# Patient Record
Sex: FEMALE | Race: WHITE | NOT HISPANIC OR LATINO | URBAN - METROPOLITAN AREA
[De-identification: names, ages, dates, MRNs, and addresses within clinical notes are randomized per-mention and may not be internally consistent; named-entity substitution may affect disease eponyms.]

---

## 2020-03-26 ENCOUNTER — INPATIENT (INPATIENT)
Facility: HOSPITAL | Age: 80
LOS: 7 days | Discharge: HOME | DRG: 56 | End: 2020-04-03
Attending: FAMILY MEDICINE | Admitting: FAMILY MEDICINE
Payer: COMMERCIAL

## 2020-03-26 VITALS
DIASTOLIC BLOOD PRESSURE: 88 MMHG | OXYGEN SATURATION: 97 % | RESPIRATION RATE: 15 BRPM | SYSTOLIC BLOOD PRESSURE: 156 MMHG | TEMPERATURE: 98 F | HEART RATE: 92 BPM

## 2020-03-26 DIAGNOSIS — C50.919 MALIGNANT NEOPLASM OF UNSPECIFIED SITE OF UNSPECIFIED FEMALE BREAST: ICD-10-CM

## 2020-03-26 DIAGNOSIS — G30.9 ALZHEIMER'S DISEASE, UNSPECIFIED: ICD-10-CM

## 2020-03-26 DIAGNOSIS — Z29.9 ENCOUNTER FOR PROPHYLACTIC MEASURES, UNSPECIFIED: ICD-10-CM

## 2020-03-26 DIAGNOSIS — R29.6 REPEATED FALLS: ICD-10-CM

## 2020-03-26 LAB
ALBUMIN SERPL ELPH-MCNC: 3.4 G/DL — SIGNIFICANT CHANGE UP (ref 3.3–5)
ALP SERPL-CCNC: 90 U/L — SIGNIFICANT CHANGE UP (ref 40–120)
ALT FLD-CCNC: 71 U/L — SIGNIFICANT CHANGE UP (ref 12–78)
ANION GAP SERPL CALC-SCNC: 7 MMOL/L — SIGNIFICANT CHANGE UP (ref 5–17)
APTT BLD: 26.3 SEC — LOW (ref 28.5–37)
AST SERPL-CCNC: 261 U/L — HIGH (ref 15–37)
BASOPHILS # BLD AUTO: 0.02 K/UL — SIGNIFICANT CHANGE UP (ref 0–0.2)
BASOPHILS NFR BLD AUTO: 0.2 % — SIGNIFICANT CHANGE UP (ref 0–2)
BILIRUB SERPL-MCNC: 0.4 MG/DL — SIGNIFICANT CHANGE UP (ref 0.2–1.2)
BUN SERPL-MCNC: 20 MG/DL — SIGNIFICANT CHANGE UP (ref 7–23)
CALCIUM SERPL-MCNC: 9 MG/DL — SIGNIFICANT CHANGE UP (ref 8.5–10.1)
CHLORIDE SERPL-SCNC: 109 MMOL/L — HIGH (ref 96–108)
CO2 SERPL-SCNC: 27 MMOL/L — SIGNIFICANT CHANGE UP (ref 22–31)
CREAT SERPL-MCNC: 1.2 MG/DL — SIGNIFICANT CHANGE UP (ref 0.5–1.3)
EOSINOPHIL # BLD AUTO: 0 K/UL — SIGNIFICANT CHANGE UP (ref 0–0.5)
EOSINOPHIL NFR BLD AUTO: 0 % — SIGNIFICANT CHANGE UP (ref 0–6)
GLUCOSE SERPL-MCNC: 131 MG/DL — HIGH (ref 70–99)
HCT VFR BLD CALC: 41.9 % — SIGNIFICANT CHANGE UP (ref 34.5–45)
HGB BLD-MCNC: 13.4 G/DL — SIGNIFICANT CHANGE UP (ref 11.5–15.5)
IMM GRANULOCYTES NFR BLD AUTO: 0.5 % — SIGNIFICANT CHANGE UP (ref 0–1.5)
INR BLD: 1.05 RATIO — SIGNIFICANT CHANGE UP (ref 0.88–1.16)
LYMPHOCYTES # BLD AUTO: 0.89 K/UL — LOW (ref 1–3.3)
LYMPHOCYTES # BLD AUTO: 7.8 % — LOW (ref 13–44)
MCHC RBC-ENTMCNC: 28.5 PG — SIGNIFICANT CHANGE UP (ref 27–34)
MCHC RBC-ENTMCNC: 32 GM/DL — SIGNIFICANT CHANGE UP (ref 32–36)
MCV RBC AUTO: 89.1 FL — SIGNIFICANT CHANGE UP (ref 80–100)
MONOCYTES # BLD AUTO: 1.34 K/UL — HIGH (ref 0–0.9)
MONOCYTES NFR BLD AUTO: 11.7 % — SIGNIFICANT CHANGE UP (ref 2–14)
NEUTROPHILS # BLD AUTO: 9.13 K/UL — HIGH (ref 1.8–7.4)
NEUTROPHILS NFR BLD AUTO: 79.8 % — HIGH (ref 43–77)
NRBC # BLD: 0 /100 WBCS — SIGNIFICANT CHANGE UP (ref 0–0)
PLATELET # BLD AUTO: 165 K/UL — SIGNIFICANT CHANGE UP (ref 150–400)
POTASSIUM SERPL-MCNC: 3.8 MMOL/L — SIGNIFICANT CHANGE UP (ref 3.5–5.3)
POTASSIUM SERPL-SCNC: 3.8 MMOL/L — SIGNIFICANT CHANGE UP (ref 3.5–5.3)
PROT SERPL-MCNC: 7.2 G/DL — SIGNIFICANT CHANGE UP (ref 6–8.3)
PROTHROM AB SERPL-ACNC: 11.8 SEC — SIGNIFICANT CHANGE UP (ref 10–12.9)
RBC # BLD: 4.7 M/UL — SIGNIFICANT CHANGE UP (ref 3.8–5.2)
RBC # FLD: 15.4 % — HIGH (ref 10.3–14.5)
SODIUM SERPL-SCNC: 143 MMOL/L — SIGNIFICANT CHANGE UP (ref 135–145)
WBC # BLD: 11.44 K/UL — HIGH (ref 3.8–10.5)
WBC # FLD AUTO: 11.44 K/UL — HIGH (ref 3.8–10.5)

## 2020-03-26 PROCEDURE — 73630 X-RAY EXAM OF FOOT: CPT | Mod: 26,RT

## 2020-03-26 PROCEDURE — 73502 X-RAY EXAM HIP UNI 2-3 VIEWS: CPT | Mod: 26,RT

## 2020-03-26 PROCEDURE — 73080 X-RAY EXAM OF ELBOW: CPT | Mod: 26,RT

## 2020-03-26 PROCEDURE — 72125 CT NECK SPINE W/O DYE: CPT | Mod: 26

## 2020-03-26 PROCEDURE — 93010 ELECTROCARDIOGRAM REPORT: CPT

## 2020-03-26 PROCEDURE — 74176 CT ABD & PELVIS W/O CONTRAST: CPT | Mod: 26

## 2020-03-26 PROCEDURE — 99222 1ST HOSP IP/OBS MODERATE 55: CPT | Mod: GC

## 2020-03-26 PROCEDURE — 70450 CT HEAD/BRAIN W/O DYE: CPT | Mod: 26

## 2020-03-26 PROCEDURE — 93971 EXTREMITY STUDY: CPT | Mod: 26,RT

## 2020-03-26 PROCEDURE — 99285 EMERGENCY DEPT VISIT HI MDM: CPT

## 2020-03-26 PROCEDURE — 73552 X-RAY EXAM OF FEMUR 2/>: CPT | Mod: 26,RT

## 2020-03-26 PROCEDURE — 71250 CT THORAX DX C-: CPT | Mod: 26

## 2020-03-26 RX ORDER — SODIUM CHLORIDE 9 MG/ML
1000 INJECTION INTRAMUSCULAR; INTRAVENOUS; SUBCUTANEOUS ONCE
Refills: 0 | Status: COMPLETED | OUTPATIENT
Start: 2020-03-26 | End: 2020-03-26

## 2020-03-26 RX ORDER — HEPARIN SODIUM 5000 [USP'U]/ML
5000 INJECTION INTRAVENOUS; SUBCUTANEOUS EVERY 8 HOURS
Refills: 0 | Status: DISCONTINUED | OUTPATIENT
Start: 2020-03-26 | End: 2020-04-03

## 2020-03-26 RX ORDER — ANASTROZOLE 1 MG/1
1 TABLET ORAL DAILY
Refills: 0 | Status: DISCONTINUED | OUTPATIENT
Start: 2020-03-26 | End: 2020-04-03

## 2020-03-26 RX ORDER — AZITHROMYCIN 500 MG/1
500 TABLET, FILM COATED ORAL ONCE
Refills: 0 | Status: COMPLETED | OUTPATIENT
Start: 2020-03-26 | End: 2020-03-26

## 2020-03-26 RX ORDER — ACETAMINOPHEN 500 MG
650 TABLET ORAL EVERY 6 HOURS
Refills: 0 | Status: DISCONTINUED | OUTPATIENT
Start: 2020-03-26 | End: 2020-04-03

## 2020-03-26 RX ADMIN — SODIUM CHLORIDE 1000 MILLILITER(S): 9 INJECTION INTRAMUSCULAR; INTRAVENOUS; SUBCUTANEOUS at 17:57

## 2020-03-26 RX ADMIN — AZITHROMYCIN 255 MILLIGRAM(S): 500 TABLET, FILM COATED ORAL at 17:57

## 2020-03-26 NOTE — H&P ADULT - HISTORY OF PRESENT ILLNESS
Pt is a 79F with PMHx Alzheimer's dementia, breast cancer, who presents to ED with frequent falls, inability to ambulate. Hx obtained from pt's family.    In the ED: CTH, CT A/P, CT c-spine, all WNL. CT Chest showing RML nodule. Xray full body series negative for acute f/x. NLR 9. RLE US neg for DVT. Pt was given 1x dose azithro, 1L NS bolus in ED. Pt is a 79F with PMHx Alzheimer's dementia, breast cancer, who presents to ED with frequent falls, inability to ambulate. Hx obtained from pt's daughter Isidra, who is her caretaker. Pt states pt had two falls yesterday, 1 witnessed w/ head strike, no LOC, 1 unwitnessed. States pt was fine yesterday, ambulating well. Demented at baseline, but usually steady on her feet. Denies signs of fevers, chills, n/v/d in her mother at home. Daughter states that her son-in-law tested COVID (+) and was self quarantining in different floor of the house, and had no contact with her mother. States mother also had episode of incontinence which was unusual for her, but states this happened while pt was on the floor.    In the ED: CTH, CT A/P, CT c-spine, all WNL. CT Chest showing RML nodule. Xray full body series negative for acute f/x. NLR 9. RLE US neg for DVT. Pt was given 1x dose azithro, 1L NS bolus in ED. Pt is a 79F with PMHx Alzheimer's dementia, breast cancer, who presents to ED with frequent falls, inability to ambulate. Hx obtained from pt's daughter Isidra, who is her caretaker. Pt states pt had two falls yesterday, 1 witnessed w/ head strike, no LOC, 1 unwitnessed. States pt was fine yesterday, ambulating well. Demented at baseline, but usually steady on her feet. Denies signs of fevers, chills, n/v/d in her mother at home. Daughter states that her son-in-law tested COVID (+) and was self quarantining in different floor of the house, and had no contact with her mother. States mother also had episode of incontinence which was unusual for her, but states this happened while pt was on the floor. [facility] d/w pt's daughter who states that her and her family are in agreement for pt DNR/DNI status, although pt does not have official HCP or MOLST filled.    In the ED: CTH, CT A/P, CT c-spine, all WNL. CT Chest showing RML nodule. Xray full body series negative for acute f/x. NLR 9. RLE US neg for DVT. Pt was given 1x dose azithro, 1L NS bolus in ED.

## 2020-03-26 NOTE — H&P ADULT - ASSESSMENT
Pt is a 79F with PMHx Alzheimer's dementia, breast cancer, who presents to ED with frequent falls, inability to ambulate, admitted for inability to ambulate 2/2 mechanical fall.

## 2020-03-26 NOTE — ED PROVIDER NOTE - UNABLE TO OBTAIN
sent to ED for frequent falls, as per daughter found patient on floor today, could not ambulate Dementia

## 2020-03-26 NOTE — H&P ADULT - NSHPSOCIALHISTORY_GEN_ALL_CORE
Lives with daughter at home  ADLs fully assisted  Ambulates ind  Former smoker 1ppd x 60 years, quit jan 2019  Occasional glass of wine

## 2020-03-26 NOTE — ED PROVIDER NOTE - OBJECTIVE STATEMENT
as per daughter frequent falls, found patient on floor today at home,  alert, awake, patient could not ambulate. family members at home with covid 19 symptoms,  patient has no covid symptoms.   PMD Dr Neal

## 2020-03-26 NOTE — ED PROVIDER NOTE - CARE PLAN
Principal Discharge DX:	Frequent falls  Secondary Diagnosis:	Walking troubles  Secondary Diagnosis:	Dementia

## 2020-03-26 NOTE — ED ADULT NURSE NOTE - OBJECTIVE STATEMENT
Pt. received alert/awake and confused w/ chief complaint as per EMS of multiple falls while at home. Pt. presents w/ bruising to right elbow and bilateral lower extremities. Pt. also presents w/ stage 1 pressure ulcer to sacral area.

## 2020-03-26 NOTE — ED ADULT TRIAGE NOTE - CHIEF COMPLAINT QUOTE
brought in by EMS from home status post unwitnessed fall x 2 today, unknown loss of consciousness. wound sustained to the left elbow. as per EMS no daily blood thinners. family member at home isolated for + COVID. patient oriented to self only at baseline

## 2020-03-26 NOTE — H&P ADULT - ATTENDING COMMENTS
Will get CT of R LE to r/o bony pathology. If negative, suspect she may have an atypical cellulitis. If WBC goes up or CT does not show bony pathology will start ABX.

## 2020-03-26 NOTE — ED PROVIDER NOTE - PROGRESS NOTE DETAILS
spoke with patient daughters,  [facility] patient was found on the floor today, tried to walk the patient, she could not ambulate, noted pain in right hip, thigh area.  [facility] patient has alzheimers dementia, has been having frequent falls,  PMD Dr Neal, quit smoking 1 year ago. as per Dr Hopper admit to Dr Lane service

## 2020-03-26 NOTE — ED PROVIDER NOTE - ATTENDING CONTRIBUTION TO CARE
Pt is a 80 yo female who presents to the ED with a cc of frequent falls and inability to ambulate. PMHx of Alzheimer's dementia, Breast cancer.  Pt with advanced dementia and is unable to provide any history at this time.  Per reports pt was found on the ground by her daughter.  Events were unwitnessed and so no history regarding this could be obtained.  When they attempted to stand her pt was unable to bear weight on her right leg.  EMS was called and pt was taken to the ED. Per history there are multiple sick contacts in the house where the pt is currently staying.  It is unsure at this time if those contacts are positive for COVID. On exam pt lying in bed awake and alert to person.  NCAT, PERRL, EOMI, heart RRR, lungs diminished at the bases, abd soft NT/ND.  No midline C/T/L TTP, FROM of UE bilaterally,  no TTP to LLE.  TTP to mid right femur with ? deformity noted.  Swelling noted diffusely to RLE with erythema and multiple abrasions noted to right foot.  Abrasion noted to left knee.  NVI to UE and LE bilaterally.  Pt with unwitnessed fall multiple COVID contacts.  Will obtains screening images, labs, and pan scan and pt is unable to provide any history.  There has to be high suspicion for COVID in the current setting.  Will monitor Pt is a 80 yo female who presents to the ED with a cc of frequent falls and inability to ambulate. PMHx of Alzheimer's dementia, Breast cancer.  Pt with advanced dementia and is unable to provide any history at this time.  Per reports pt was found on the ground by her daughter.  Events were unwitnessed and so no history regarding this could be obtained.  When they attempted to stand her pt was unable to bear weight on her right leg.  EMS was called and pt was taken to the ED. Per history there are multiple sick contacts in the house where the pt is currently staying.  It is unsure at this time if those contacts are positive for COVID. On exam pt lying in bed awake and alert to person.  NCAT, PERRL, EOMI, heart RRR, lungs diminished at the bases, abd soft NT/ND.  No midline C/T/L TTP, FROM of UE bilaterally,  no TTP to LLE.  TTP to mid right femur with ? deformity noted. RLE shortened and internally rotated.  Swelling noted diffusely to RLE with erythema and multiple abrasions noted to right foot.  Abrasion noted to left knee.  NVI to UE and LE bilaterally.  Pt with unwitnessed fall multiple COVID contacts.  Will obtains screening images, labs, and pan scan and pt is unable to provide any history.  There has to be high suspicion for COVID in the current setting.  Will monitor Pt is a 80 yo female who presents to the ED with a cc of frequent falls and inability to ambulate. PMHx of Alzheimer's dementia, Breast cancer.  Pt with advanced dementia and is unable to provide any history at this time.  Per reports pt was found on the ground by her daughter.  Events were unwitnessed and so no history regarding this could be obtained.  When they attempted to stand her pt was unable to bear weight on her right leg.  EMS was called and pt was taken to the ED. Per history there are multiple sick contacts in the house where the pt is currently staying.  It is unsure at this time if those contacts are positive for COVID. On exam pt lying in bed awake and alert to person.  NCAT, PERRL, EOMI, heart RRR, lungs diminished at the bases, abd soft NT/ND.  No midline C/T/L TTP, FROM of UE bilaterally,  no TTP to LLE.  TTP to mid right femur with ? deformity noted. RLE shortened and internally rotated.  Swelling noted diffusely to RLE with erythema and multiple abrasions noted to right foot.  Abrasion noted to left knee.  NVI to UE and LE bilaterally.  Pt with unwitnessed fall multiple COVID contacts.  Will obtains screening images, labs, and pan scan and pt is unable to provide any history.  There has to be high suspicion for COVID in the current setting.  Will monitor.  If pt is not able to ambulate will require admission

## 2020-03-26 NOTE — H&P ADULT - PROBLEM SELECTOR PLAN 4
IMPROVE VTE Individual Risk Assessment          RISK                                                          Points  [  ] Previous VTE                                                3  [  ] Thrombophilia                                             2  [  ] Lower limb paralysis                                   2        (unable to hold up >15 seconds)    [ x ] Current Cancer                                             2         (within 6 months)  [  ] Immobilization > 24 hrs                              1  [  ] ICU/CCU stay > 24 hours                             1  [ x ] Age > 60                                                         1    IMPROVE VTE Score: 3, start heparin for DVT ppx

## 2020-03-26 NOTE — H&P ADULT - PROBLEM SELECTOR PLAN 1
admit to [facility]  - pt with frequent falls, suspect likely from deconditioning in setting of worsening dementia. Pt did have episode bowel/bladder incontinence x1 and has been unable to ambulate since last fall. Will monitor for need to workup for cauda equina, low suspicion at this time.  - ortho eval requested  - OOB w/ assistance, fall risk protocol  - US RLE neg for DVT   - X ray full body series unremarkable for acute f/x  - CTH WNL, CT C spine WNL  - CT Chest findings as discussed below, nonacute  - PT eval, SLP eval  - Doubt COVID even though pt has sick contacts at home, they have been isolated and pt thus far has CXR WNL, and is without resp distress. admit to [facility]  - pt with frequent falls, suspect likely from deconditioning in setting of worsening dementia. Pt did have episode bowel/bladder incontinence x1 and has been unable to ambulate since last fall. Will monitor for need to workup for cauda equina, low suspicion at this time.  - ortho eval requested  - OOB w/ assistance, fall risk protocol  - US RLE neg for DVT   - X ray full body series unremarkable for acute f/x  - CTH WNL, CT C spine WNL  - CT Chest findings as discussed below, nonacute  - PT eval, SLP eval  - Doubt COVID even though pt has sick contacts at home, they have been isolated and pt thus far has CXR WNL, and is without resp distress.  - check CT RLE r/o f/x admit to [facility]  - pt with frequent falls, suspect likely from deconditioning in setting of worsening dementia. Pt did have episode bowel/bladder incontinence x1 and has been unable to ambulate since last fall. Will monitor for need to workup for cauda equina, low suspicion at this time.  - OOB w/ assistance, fall risk protocol  - US RLE neg for DVT   - X ray full body series unremarkable for acute f/x  - CTH WNL, CT C spine WNL  - CT Chest findings as discussed below, nonacute  - PT eval, SLP eval  - Doubt COVID even though pt has sick contacts at home, they have been isolated and pt thus far has CXR WNL, and is without resp distress.  - check CT RLE r/o f/x or infection

## 2020-03-27 LAB
ANION GAP SERPL CALC-SCNC: 13 MMOL/L — SIGNIFICANT CHANGE UP (ref 5–17)
APPEARANCE UR: ABNORMAL
BASOPHILS # BLD AUTO: 0.02 K/UL — SIGNIFICANT CHANGE UP (ref 0–0.2)
BASOPHILS NFR BLD AUTO: 0.2 % — SIGNIFICANT CHANGE UP (ref 0–2)
BILIRUB UR-MCNC: NEGATIVE — SIGNIFICANT CHANGE UP
BUN SERPL-MCNC: 22 MG/DL — SIGNIFICANT CHANGE UP (ref 7–23)
CALCIUM SERPL-MCNC: 8.4 MG/DL — LOW (ref 8.5–10.1)
CHLORIDE SERPL-SCNC: 107 MMOL/L — SIGNIFICANT CHANGE UP (ref 96–108)
CO2 SERPL-SCNC: 24 MMOL/L — SIGNIFICANT CHANGE UP (ref 22–31)
COLOR SPEC: YELLOW — SIGNIFICANT CHANGE UP
CREAT SERPL-MCNC: 1 MG/DL — SIGNIFICANT CHANGE UP (ref 0.5–1.3)
CRP SERPL-MCNC: 9.13 MG/DL — HIGH (ref 0–0.4)
DIFF PNL FLD: ABNORMAL
EOSINOPHIL # BLD AUTO: 0 K/UL — SIGNIFICANT CHANGE UP (ref 0–0.5)
EOSINOPHIL NFR BLD AUTO: 0 % — SIGNIFICANT CHANGE UP (ref 0–6)
ERYTHROCYTE [SEDIMENTATION RATE] IN BLOOD: 23 MM/HR — HIGH (ref 0–20)
GLUCOSE SERPL-MCNC: 120 MG/DL — HIGH (ref 70–99)
GLUCOSE UR QL: NEGATIVE — SIGNIFICANT CHANGE UP
HCT VFR BLD CALC: 39.3 % — SIGNIFICANT CHANGE UP (ref 34.5–45)
HGB BLD-MCNC: 12.6 G/DL — SIGNIFICANT CHANGE UP (ref 11.5–15.5)
IMM GRANULOCYTES NFR BLD AUTO: 0.4 % — SIGNIFICANT CHANGE UP (ref 0–1.5)
KETONES UR-MCNC: ABNORMAL
LEUKOCYTE ESTERASE UR-ACNC: NEGATIVE — SIGNIFICANT CHANGE UP
LYMPHOCYTES # BLD AUTO: 0.95 K/UL — LOW (ref 1–3.3)
LYMPHOCYTES # BLD AUTO: 9.8 % — LOW (ref 13–44)
MCHC RBC-ENTMCNC: 28.6 PG — SIGNIFICANT CHANGE UP (ref 27–34)
MCHC RBC-ENTMCNC: 32.1 GM/DL — SIGNIFICANT CHANGE UP (ref 32–36)
MCV RBC AUTO: 89.1 FL — SIGNIFICANT CHANGE UP (ref 80–100)
MONOCYTES # BLD AUTO: 0.83 K/UL — SIGNIFICANT CHANGE UP (ref 0–0.9)
MONOCYTES NFR BLD AUTO: 8.5 % — SIGNIFICANT CHANGE UP (ref 2–14)
NEUTROPHILS # BLD AUTO: 7.87 K/UL — HIGH (ref 1.8–7.4)
NEUTROPHILS NFR BLD AUTO: 81.1 % — HIGH (ref 43–77)
NITRITE UR-MCNC: NEGATIVE — SIGNIFICANT CHANGE UP
NRBC # BLD: 0 /100 WBCS — SIGNIFICANT CHANGE UP (ref 0–0)
PH UR: 5 — SIGNIFICANT CHANGE UP (ref 5–8)
PLATELET # BLD AUTO: 156 K/UL — SIGNIFICANT CHANGE UP (ref 150–400)
POTASSIUM SERPL-MCNC: 3.7 MMOL/L — SIGNIFICANT CHANGE UP (ref 3.5–5.3)
POTASSIUM SERPL-SCNC: 3.7 MMOL/L — SIGNIFICANT CHANGE UP (ref 3.5–5.3)
PROT UR-MCNC: 100
RBC # BLD: 4.41 M/UL — SIGNIFICANT CHANGE UP (ref 3.8–5.2)
RBC # FLD: 15.8 % — HIGH (ref 10.3–14.5)
SARS-COV-2 RNA SPEC QL NAA+PROBE: DETECTED
SODIUM SERPL-SCNC: 144 MMOL/L — SIGNIFICANT CHANGE UP (ref 135–145)
SP GR SPEC: 1.02 — SIGNIFICANT CHANGE UP (ref 1.01–1.02)
UROBILINOGEN FLD QL: NEGATIVE — SIGNIFICANT CHANGE UP
WBC # BLD: 9.71 K/UL — SIGNIFICANT CHANGE UP (ref 3.8–10.5)
WBC # FLD AUTO: 9.71 K/UL — SIGNIFICANT CHANGE UP (ref 3.8–10.5)

## 2020-03-27 PROCEDURE — 73700 CT LOWER EXTREMITY W/O DYE: CPT | Mod: 26,RT

## 2020-03-27 PROCEDURE — 71045 X-RAY EXAM CHEST 1 VIEW: CPT | Mod: 26

## 2020-03-27 PROCEDURE — 99232 SBSQ HOSP IP/OBS MODERATE 35: CPT

## 2020-03-27 RX ORDER — ACETAMINOPHEN 500 MG
650 TABLET ORAL EVERY 6 HOURS
Refills: 0 | Status: DISCONTINUED | OUTPATIENT
Start: 2020-03-27 | End: 2020-04-03

## 2020-03-27 RX ADMIN — HEPARIN SODIUM 5000 UNIT(S): 5000 INJECTION INTRAVENOUS; SUBCUTANEOUS at 21:17

## 2020-03-27 RX ADMIN — ANASTROZOLE 1 MILLIGRAM(S): 1 TABLET ORAL at 18:14

## 2020-03-27 RX ADMIN — Medication 650 MILLIGRAM(S): at 07:42

## 2020-03-27 RX ADMIN — Medication 650 MILLIGRAM(S): at 18:50

## 2020-03-27 RX ADMIN — Medication 650 MILLIGRAM(S): at 19:59

## 2020-03-27 RX ADMIN — HEPARIN SODIUM 5000 UNIT(S): 5000 INJECTION INTRAVENOUS; SUBCUTANEOUS at 06:50

## 2020-03-27 RX ADMIN — Medication 650 MILLIGRAM(S): at 08:42

## 2020-03-27 NOTE — ED ADULT NURSE REASSESSMENT NOTE - NS ED NURSE REASSESS COMMENT FT1
Pt cleaned, turned and repositioned. Pt has rectal temp. will reassess.
Pt vomited at 9 p.m. last night
Spoke with pt's son jet, updated on plan of care.
Pt ate full yogurt container and milk for breakfast. Repeat rectal temp 100.1

## 2020-03-27 NOTE — PATIENT PROFILE ADULT - NSPROGENSOURCEINFO_GEN_A_NUR
patient H&P reviewed  spoke with dtr via phone/family/health record/patient patient/family/health record/H&P reviewed  spoke with dtr via phone Isidra Cortes [phone](c) [phone](h) Vita [place] [phone](c) dtr

## 2020-03-27 NOTE — PATIENT PROFILE ADULT - FALL HARM RISK
other other/bones(Osteoporosis,prev fx,steroid use,metastatic bone ca)/general weakness, History of dementia

## 2020-03-27 NOTE — PATIENT PROFILE ADULT - NSASFALLWHENOCCURRED_GEN_A_NUR
this admission 3/26 fell twice at home last fall @2pm fell between bed and dresser per dtr/last six months

## 2020-03-27 NOTE — PATIENT PROFILE ADULT - NSPROPTRIGHTREPNAME_GEN_A__NUR
[address]  [place] [id number] [phone]               Thank you for choosing us for your health care visit with ZAHRAA Mccray.   We are glad to serve you and happy to provide you with this summary of yo physician's office. At that time, you will be provided with any authorization numbers or be assured that none are required. You can then schedule your appointment.  Failure to obtain required authorization numbers can create reimbursement difficulties for y Hydrocortisone 2.5 % Lotn   Apply 1 Application topically 2 (two) times daily. Apply sparingly to affected area twice a day as needed           lisinopril 10 MG Tabs   Take 10 mg by mouth daily.    Commonly known as:  PRINIVIL,ZESTRIL           LOTRISONE 1 Support Staff. Remember, MyChart is NOT to be used for urgent needs. For medical emergencies, dial 911.            Visit [facility] online at  [email or web address] Any [place] [phone](c) dtr Any [place] [phone](c) dtr.

## 2020-03-27 NOTE — GOALS OF CARE CONVERSATION - ADVANCED CARE PLANNING - CONVERSATION DETAILS
Pt daughter states pt never completed HCP but all three children make decisions together. She states that they did dicuss GOC for pt and that they want limited treatment. No extraordinary LST. They agree to DNR/DNI

## 2020-03-27 NOTE — PROGRESS NOTE ADULT - SUBJECTIVE AND OBJECTIVE BOX
CHIEF COMPLAINT/INTERVAL HISTORY:  Pt. seen and evaluated for falls.  Pt. is in no distress.  Although appears confused with her Alzheimer's dementia.  Currently in ED talking about her babies when I was discussing her overall condition.  +fever 102.4    REVIEW OF SYSTEMS:  +fever.  No CP, SOB, or abdominal pain.      Vital Signs Last 24 Hrs  T(C): 37.8 (27 Mar 2020 08:42), Max: 39.1 (27 Mar 2020 07:19)  T(F): 100.1 (27 Mar 2020 08:42), Max: 102.4 (27 Mar 2020 07:19)  HR: 89 (27 Mar 2020 09:48) (80 - 92)  BP: 127/69 (27 Mar 2020 09:48) (127/69 - 193/82)  BP(mean): --  RR: 18 (27 Mar 2020 09:48) (15 - 18)  SpO2: 95% (27 Mar 2020 09:48) (95% - 98%)    PHYSICAL EXAM:  GENERAL: NAD  HEENT: EOMI, hearing normal, conjunctiva and sclera clear  Chest: Diminished BS at bases, no wheezing  CV: S1S2, RRR,   GI: soft, +BS, NT/ND  Musculoskeletal: +RLE edema  Psychiatric: +dementia  Skin: warm and dry    LABS:                        12.6   9.71  )-----------( 156      ( 27 Mar 2020 08:20 )             39.3     03-27    144  |  107  |  22  ----------------------------<  120<H>  3.7   |  24  |  1.00    Ca    8.4<L>      27 Mar 2020 08:20    TPro  7.2  /  Alb  3.4  /  TBili  0.4  /  DBili  x   /  AST  261<H>  /  ALT  71  /  AlkPhos  90  03-26    PT/INR - ( 26 Mar 2020 16:17 )   PT: 11.8 sec;   INR: 1.05 ratio         PTT - ( 26 Mar 2020 16:17 )  PTT:26.3 sec      Assessment and Plan:  -Falls:  CT RLE with no displaced fracture.  CT head and cervical spine: No acute fractures or dislocations.  Multilevel cervical spondylosis.  No acute intracranial hemorrhage, mass effect, or shift of the midline structures. RLE doppler negative for DVT.  X-rays with no acute fracture or bony pathology. PT evaluation.   -Fever:  CT C/A/P with no acute findings.  RML nodule recommending 6 month follow up imaging as outpatient.  Check COVID 19 PCR, blood cx, and urine cx.  Tylenol PRN  -Alzheimer's dementia:  supportive care  -Hx of Breast cancer:  continue Arimidex 1mg PO daily  -VTE ppx: Heparin 5000 units SQ Q8h

## 2020-03-28 DIAGNOSIS — R91.1 SOLITARY PULMONARY NODULE: ICD-10-CM

## 2020-03-28 DIAGNOSIS — B34.2 CORONAVIRUS INFECTION, UNSPECIFIED: ICD-10-CM

## 2020-03-28 LAB
ANION GAP SERPL CALC-SCNC: 7 MMOL/L — SIGNIFICANT CHANGE UP (ref 5–17)
BASOPHILS # BLD AUTO: 0.03 K/UL — SIGNIFICANT CHANGE UP (ref 0–0.2)
BASOPHILS NFR BLD AUTO: 0.3 % — SIGNIFICANT CHANGE UP (ref 0–2)
BUN SERPL-MCNC: 30 MG/DL — HIGH (ref 7–23)
CALCIUM SERPL-MCNC: 8.8 MG/DL — SIGNIFICANT CHANGE UP (ref 8.5–10.1)
CHLORIDE SERPL-SCNC: 110 MMOL/L — HIGH (ref 96–108)
CO2 SERPL-SCNC: 25 MMOL/L — SIGNIFICANT CHANGE UP (ref 22–31)
CREAT SERPL-MCNC: 0.98 MG/DL — SIGNIFICANT CHANGE UP (ref 0.5–1.3)
CULTURE RESULTS: SIGNIFICANT CHANGE UP
EOSINOPHIL # BLD AUTO: 0 K/UL — SIGNIFICANT CHANGE UP (ref 0–0.5)
EOSINOPHIL NFR BLD AUTO: 0 % — SIGNIFICANT CHANGE UP (ref 0–6)
GLUCOSE SERPL-MCNC: 88 MG/DL — SIGNIFICANT CHANGE UP (ref 70–99)
HCT VFR BLD CALC: 36.3 % — SIGNIFICANT CHANGE UP (ref 34.5–45)
HGB BLD-MCNC: 11.4 G/DL — LOW (ref 11.5–15.5)
IMM GRANULOCYTES NFR BLD AUTO: 0.6 % — SIGNIFICANT CHANGE UP (ref 0–1.5)
LYMPHOCYTES # BLD AUTO: 0.95 K/UL — LOW (ref 1–3.3)
LYMPHOCYTES # BLD AUTO: 11 % — LOW (ref 13–44)
MAGNESIUM SERPL-MCNC: 2.2 MG/DL — SIGNIFICANT CHANGE UP (ref 1.6–2.6)
MCHC RBC-ENTMCNC: 28.2 PG — SIGNIFICANT CHANGE UP (ref 27–34)
MCHC RBC-ENTMCNC: 31.4 GM/DL — LOW (ref 32–36)
MCV RBC AUTO: 89.9 FL — SIGNIFICANT CHANGE UP (ref 80–100)
MONOCYTES # BLD AUTO: 0.9 K/UL — SIGNIFICANT CHANGE UP (ref 0–0.9)
MONOCYTES NFR BLD AUTO: 10.4 % — SIGNIFICANT CHANGE UP (ref 2–14)
NEUTROPHILS # BLD AUTO: 6.71 K/UL — SIGNIFICANT CHANGE UP (ref 1.8–7.4)
NEUTROPHILS NFR BLD AUTO: 77.7 % — HIGH (ref 43–77)
NRBC # BLD: 0 /100 WBCS — SIGNIFICANT CHANGE UP (ref 0–0)
PLATELET # BLD AUTO: 140 K/UL — LOW (ref 150–400)
POTASSIUM SERPL-MCNC: 4.1 MMOL/L — SIGNIFICANT CHANGE UP (ref 3.5–5.3)
POTASSIUM SERPL-SCNC: 4.1 MMOL/L — SIGNIFICANT CHANGE UP (ref 3.5–5.3)
RBC # BLD: 4.04 M/UL — SIGNIFICANT CHANGE UP (ref 3.8–5.2)
RBC # FLD: 16.2 % — HIGH (ref 10.3–14.5)
SODIUM SERPL-SCNC: 142 MMOL/L — SIGNIFICANT CHANGE UP (ref 135–145)
SPECIMEN SOURCE: SIGNIFICANT CHANGE UP
WBC # BLD: 8.64 K/UL — SIGNIFICANT CHANGE UP (ref 3.8–10.5)
WBC # FLD AUTO: 8.64 K/UL — SIGNIFICANT CHANGE UP (ref 3.8–10.5)

## 2020-03-28 PROCEDURE — 99232 SBSQ HOSP IP/OBS MODERATE 35: CPT

## 2020-03-28 RX ADMIN — HEPARIN SODIUM 5000 UNIT(S): 5000 INJECTION INTRAVENOUS; SUBCUTANEOUS at 21:00

## 2020-03-28 RX ADMIN — Medication 650 MILLIGRAM(S): at 09:30

## 2020-03-28 RX ADMIN — HEPARIN SODIUM 5000 UNIT(S): 5000 INJECTION INTRAVENOUS; SUBCUTANEOUS at 05:08

## 2020-03-28 RX ADMIN — HEPARIN SODIUM 5000 UNIT(S): 5000 INJECTION INTRAVENOUS; SUBCUTANEOUS at 13:51

## 2020-03-28 RX ADMIN — Medication 650 MILLIGRAM(S): at 07:57

## 2020-03-28 RX ADMIN — ANASTROZOLE 1 MILLIGRAM(S): 1 TABLET ORAL at 13:51

## 2020-03-28 NOTE — PROGRESS NOTE ADULT - SUBJECTIVE AND OBJECTIVE BOX
CHIEF COMPLAINT/INTERVAL HISTORY:  Pt. seen and evaluated for COVID 19 infection and falls.  Pt. is in no distress.  +ALzheimer's dementia.  Denies SOB.    +Fever 101.6    REVIEW OF SYSTEMS:  +fever.  Denies SOB, CP, or abdominal pain.     Vital Signs Last 24 Hrs  T(C): 36.9 (28 Mar 2020 10:39), Max: 38.7 (27 Mar 2020 18:45)  T(F): 98.5 (28 Mar 2020 10:39), Max: 101.6 (27 Mar 2020 18:45)  HR: 78 (28 Mar 2020 05:04) (78 - 83)  BP: 135/69 (28 Mar 2020 05:04) (115/75 - 135/69)  BP(mean): --  RR: 18 (28 Mar 2020 05:04) (18 - 18)  SpO2: 98% (28 Mar 2020 05:04) (88% - 100%)    PHYSICAL EXAM:  GENERAL: NAD  HEENT: EOMI, hearing normal, conjunctiva and sclera clear  Chest: Diminished BS at bases, no wheezing  CV: S1S2, RRR,   GI: soft, +BS, NT/ND  Musculoskeletal: trace RLE edema  Psychiatric: +dementia  Skin: warm and dry    LABS:                        11.4   8.64  )-----------( 140      ( 28 Mar 2020 09:31 )             36.3     03-28    142  |  110<H>  |  30<H>  ----------------------------<  88  4.1   |  25  |  0.98    Ca    8.8      28 Mar 2020 09:31  Mg     2.2     03-28    TPro  7.2  /  Alb  3.4  /  TBili  0.4  /  DBili  x   /  AST  261<H>  /  ALT  71  /  AlkPhos  90  03-26    PT/INR - ( 26 Mar 2020 16:17 )   PT: 11.8 sec;   INR: 1.05 ratio         PTT - ( 26 Mar 2020 16:17 )  PTT:26.3 sec  Urinalysis Basic - ( 27 Mar 2020 20:30 )    Color: Yellow / Appearance: Slightly Turbid / S.025 / pH: x  Gluc: x / Ketone: Trace  / Bili: Negative / Urobili: Negative   Blood: x / Protein: 100 / Nitrite: Negative   Leuk Esterase: Negative / RBC: 0-2 /HPF / WBC 3-5   Sq Epi: x / Non Sq Epi: Few / Bacteria: Occasional        Assessment and Plan:  -Falls:  CT RLE with no displaced fracture.  CT head and cervical spine: No acute fractures or dislocations.  Multilevel cervical spondylosis.  No acute intracranial hemorrhage, mass effect, or shift of the midline structures. RLE doppler negative for DVT.  X-rays with no acute fracture or bony pathology.  PT evaluation.   -Fever 2/2 COVID 19 infection:  CT C/A/P with no acute findings.  RML nodule recommending 6 month follow up imaging as outpatient.  Check blood cx, and urine cx.  Tylenol PRN.  ID consult.   -Alzheimer's dementia:  supportive care  -Hx of Breast cancer:  continue Arimidex 1mg PO daily  -VTE ppx: Heparin 5000 units SQ Q8h

## 2020-03-28 NOTE — DISCHARGE NOTE PROVIDER - HOSPITAL COURSE
FROM ADMISSION H+P:     HPI:    Pt is a 79F with PMHx Alzheimer's dementia, breast cancer, who presents to ED with frequent falls, inability to ambulate. Hx obtained from pt's daughter Isidra, who is her caretaker. Pt states pt had two falls yesterday, 1 witnessed w/ head strike, no LOC, 1 unwitnessed. States pt was fine yesterday, ambulating well. Demented at baseline, but usually steady on her feet. Denies signs of fevers, chills, n/v/d in her mother at home. Daughter states that her son-in-law tested COVID (+) and was self quarantining in different floor of the house, and had no contact with her mother. States mother also had episode of incontinence which was unusual for her, but states this happened while pt was on the floor. GOC d/w pt's daughter who states that her and her family are in agreement for pt DNR/DNI status, although pt does not have official HCP or MOLST filled.        In the ED: CTH, CT A/P, CT c-spine, all WNL. CT Chest showing RML nodule. Xray full body series negative for acute f/x. NLR 9. RLE US neg for DVT. Pt was given 1x dose azithro, 1L NS bolus in ED. (26 Mar 2020 22:34)            ---    HOSPITAL COURSE:         Patient transferred to general medical floor for further management. CT RLE with no displaced fracture.  CT head and cervical spine: No acute fractures or dislocations.  Multilevel cervical spondylosis.  No acute intracranial hemorrhage, mass effect, or shift of the midline structures. RLE doppler negative for DVT.  X-rays with no acute fracture or bony pathology. Due to sick contacts, patient tested for COVID19. COVID19 PCR resulted positive. CT C/A/P with out any acute findings. RML nodule found requiring outpatient follow up with imaging in 6 months. Infectious disease, Dr. Julian consulted. Patient became febrile throughout stay and was treated with Tylenol. Patient was evaluated by PT during stay            ---    CONSULTANTS:         Infectious Disease - Dr. Julian        ---    TIME SPENT:    The total amount of time spent reviewing the hospital notes, laboratory values, imaging findings, assessing/counseling the patient, discussing with consultant physicians, social work, nursing staff was -- minutes        --- FROM ADMISSION H+P:     HPI:    Pt is a 79F with PMHx Alzheimer's dementia, breast cancer, who presents to ED with frequent falls, inability to ambulate. Hx obtained from pt's daughter Isidra, who is her caretaker. Pt states pt had two falls yesterday, 1 witnessed w/ head strike, no LOC, 1 unwitnessed. States pt was fine yesterday, ambulating well. Demented at baseline, but usually steady on her feet. Denies signs of fevers, chills, n/v/d in her mother at home. Daughter states that her son-in-law tested COVID (+) and was self quarantining in different floor of the house, and had no contact with her mother. States mother also had episode of incontinence which was unusual for her, but states this happened while pt was on the floor. GOC d/w pt's daughter who states that her and her family are in agreement for pt DNR/DNI status, although pt does not have official HCP or MOLST filled.        In the ED: CTH, CT A/P, CT c-spine, all WNL. CT Chest showing RML nodule. Xray full body series negative for acute f/x. NLR 9. RLE US neg for DVT. Pt was given 1x dose azithro, 1L NS bolus in ED. (26 Mar 2020 22:34)            ---    HOSPITAL COURSE:         Patient transferred to general medical floor for further management. CT RLE with no displaced fracture.  CT head and cervical spine: No acute fractures or dislocations.  Multilevel cervical spondylosis.  No acute intracranial hemorrhage, mass effect, or shift of the midline structures. RLE doppler negative for DVT.  X-rays with no acute fracture or bony pathology. Due to sick contacts, patient tested for COVID19. COVID19 PCR resulted positive. CT C/A/P with out any acute findings. RML nodule found requiring outpatient follow up with imaging in 6 months. Infectious disease, Dr. Julian consulted. Patient became febrile throughout stay and was treated with Tylenol. Patient was evaluated by PT during stay and rehab was recommended upon discharge. The patient's respiratory status was monitored closely. The highest requirement of supplement O2 was nasal cannula, and patient was able to safely be weaned back to room air where she continued to saturate well. Patient is considered stable for discharge to subacute rehab.            ---    CONSULTANTS:         Infectious Disease - Dr. Julian        ---    TIME SPENT:    The total amount of time spent reviewing the hospital notes, laboratory values, imaging findings, assessing/counseling the patient, discussing with consultant physicians, social work, nursing staff was -- minutes        ---        Physical exam on day of discharge:        GENERAL: no acute distress    HEENT: NC/AT, EOMI, neck supple, MMM    RESPIRATORY: LCTAB/L, no rhonchi, rales, or wheezing    CARDIOVASCULAR: RRR, no murmurs, gallops, rubs    ABDOMINAL: soft, non-tender, non-distended, positive bowel sounds     EXTREMITIES: no clubbing, cyanosis, or edema    NEUROLOGICAL: alert and oriented x 3, non-focal    SKIN: no rashes or lesions     MUSCULOSKELETAL: no gross joint deformity FROM ADMISSION H+P:     HPI:    Pt is a 79F with PMHx Alzheimer's dementia, breast cancer, who presents to ED with frequent falls, inability to ambulate. Hx obtained from pt's daughter Isidra, who is her caretaker. Pt states pt had two falls yesterday, 1 witnessed w/ head strike, no LOC, 1 unwitnessed. States pt was fine yesterday, ambulating well. Demented at baseline, but usually steady on her feet. Denies signs of fevers, chills, n/v/d in her mother at home. Daughter states that her son-in-law tested COVID (+) and was self quarantining in different floor of the house, and had no contact with her mother. States mother also had episode of incontinence which was unusual for her, but states this happened while pt was on the floor. GOC d/w pt's daughter who states that her and her family are in agreement for pt DNR/DNI status, although pt does not have official HCP or MOLST filled.        In the ED: CTH, CT A/P, CT c-spine, all WNL. CT Chest showing RML nodule. Xray full body series negative for acute f/x. NLR 9. RLE US neg for DVT. Pt was given 1x dose azithro, 1L NS bolus in ED. (26 Mar 2020 22:34)            ---    HOSPITAL COURSE:         Patient transferred to general medical floor for further management. CT RLE with no displaced fracture.  CT head and cervical spine: No acute fractures or dislocations.  Multilevel cervical spondylosis.  No acute intracranial hemorrhage, mass effect, or shift of the midline structures. RLE doppler negative for DVT.  X-rays with no acute fracture or bony pathology. Due to sick contacts, patient tested for COVID19. COVID19 PCR resulted positive. CT C/A/P with out any acute findings. RML nodule found requiring outpatient follow up with imaging in 6 months. Infectious disease, Dr. Julian consulted. Patient became febrile throughout stay and was treated with Tylenol. Patient was evaluated by PT during stay and rehab was recommended upon discharge. The patient's respiratory status was monitored closely. The highest requirement of supplement O2 was nasal cannula, and patient was able to safely be weaned back to room air where she continued to saturate well. Patient is considered stable for discharge to subacute rehab.            ---    CONSULTANTS:         Infectious Disease - Dr. Julian        ---    TIME SPENT:    The total amount of time spent reviewing the hospital notes, laboratory values, imaging findings, assessing/counseling the patient, discussing with consultant physicians, social work, nursing staff was 39 minutes by attending physician        ---        Physical exam on day of discharge:        GENERAL: no acute distress    HEENT: NC/AT, EOMI, neck supple, MMM    RESPIRATORY: LCTAB/L, no rhonchi, rales, or wheezing    CARDIOVASCULAR: RRR, no murmurs, gallops, rubs    ABDOMINAL: soft, non-tender, non-distended, positive bowel sounds     EXTREMITIES: no clubbing, cyanosis, or edema    NEUROLOGICAL: alert and oriented x 1, non-focal    SKIN: no rashes or lesions     MUSCULOSKELETAL: no gross joint deformity

## 2020-03-28 NOTE — DISCHARGE NOTE PROVIDER - NSDCCPCAREPLAN_GEN_ALL_CORE_FT
PRINCIPAL DISCHARGE DIAGNOSIS  Diagnosis: Frequent falls  Assessment and Plan of Treatment:       SECONDARY DISCHARGE DIAGNOSES  Diagnosis: Infection due to 2019 novel coronavirus  Assessment and Plan of Treatment:     Diagnosis: Nodule of right lung  Assessment and Plan of Treatment: A nodule was noted on your CT scan in the right middle lobe  Please follow up with repeat imaging in 6 months PRINCIPAL DISCHARGE DIAGNOSIS  Diagnosis: Frequent falls  Assessment and Plan of Treatment: You were brought to the hospital because of more frequent falls causing you to hit your head. A CT scan of your head was performed which showed no signs of a bleed or other worrisome findings. A physical therapist evaluated you and determined there was a need for rehab, where you can work with therapists to regain your strength.      SECONDARY DISCHARGE DIAGNOSES  Diagnosis: Infection due to 2019 novel coronavirus  Assessment and Plan of Treatment: Though you were brought to the hospital for another reason, you reported a positive sick contact at home. Despite isolation precautions that were taken at home, you tested positive for the novel coronavirus and were placed in isolation at the hospital. Your breathing status was monitored closely and you were given supplemental oxygen whenever it was needed. Your breathing status remained stable while you were in the hospital.    Diagnosis: Nodule of right lung  Assessment and Plan of Treatment: A nodule was noted on your chest CT scan in the right middle lobe. Please follow up with repeat imaging in 6 months.

## 2020-03-28 NOTE — CONSULT NOTE ADULT - ASSESSMENT
COVID-19/SARS-CoV-2 infection.  No pneumonia  SaO2 96% on RA  No inflammatory markers  NLR >5   No concern of other infection on the basis of exam  Suspect this is baseline mental status    The clincal and experimental literature involving medications in SARS-CoV-2/COVID-19 evolves rapidly as we learn more about the virus.     Chloroquine/Hydroxychloroquine, +/- azithromyicn. Use of these drugs are based on a series of 26 patients by Yoni et al (J Antimicrob Agents, 2020). While study did show a reduction in viral load, it has significant limitations. Patients with prolonged QTI; who were breatfeeding or pregnant; or had other contraindications to the use of the drug were excluded. Six of the 26 patients in the experimental arm were lost to follow up further diminishing the power if the data (including 3 because of transfer to ICU-level care). Moreover, only virologic data is presented, not outcomes data. As such whie the use of these drugs hold promise, their use remains off-label and investigational. In a paper by Evie et al. (J [facility], March 2020), a study comparing chloroquine vs placebo, the 15 patients in the experimental arm did not fare any better than placebo. Of note the text of this study is in Chinese, with only the abstract translated into English, further limiting its interpretation.      ACE and ARB. there is no clinical evidence that ACE-I or ARB increase or decrease the severity of COVID-19. There is some theoretical concern that ACE-I and ARB, by upregualting the espression of ACE2, may be contribute to the increased risk of severe disease noted in individuals with underlying cardiovascular disease. SARS-CoV-2 enters epitheial cells via the ACE2 receptors. Other researches suggest however thar ACE2 may be beneficial and ACE-I /ARB may be beneficial.  As per ACC guidelines, ACE-I or ARB should not be started as prophylaxis vs. COVID-19, and those who are on these medications and subsequently contract COVID-19 should continue to take them.     Steroids. Unless being used for ARDS or refractory shock, steroids should be avoided.     NSAIDS. Individuals who are taking NSAIDS for other reasons should not stop them. There is no convincing evidence that NSAIDS negatively impact the course of COVID-19 infection. Potential AE related to NSAIDS however can be problematic, especially in the critically ill patient (e.g. TIARA, GI Bleeding).     Neurmanminidase inhibitors used for treatment of influenza and protease inhibitors used in the treatment of HIV have not been shown to be useful.     Remdesivir. Available on invesitgational protocol only at this point in time.     IL-6 inhibitors. Available on an investigational basis in patients who have severe disease to limit cytokine-mediated damage.    At this point in time I would recommend supportive care only

## 2020-03-28 NOTE — DIETITIAN INITIAL EVALUATION ADULT. - PROBLEM SELECTOR PLAN 1
admit to F  - pt with frequent falls, suspect likely from deconditioning in setting of worsening dementia. Pt did have episode bowel/bladder incontinence x1 and has been unable to ambulate since last fall. Will monitor for need to workup for cauda equina, low suspicion at this time.  - OOB w/ assistance, fall risk protocol  - US RLE neg for DVT   - X ray full body series unremarkable for acute f/x  - CTH WNL, CT C spine WNL  - CT Chest findings as discussed below, nonacute  - PT eval, SLP eval  - Doubt COVID even though pt has sick contacts at home, they have been isolated and pt thus far has CXR WNL, and is without resp distress.  - check CT RLE r/o f/x or infection

## 2020-03-28 NOTE — CONSULT NOTE ADULT - SUBJECTIVE AND OBJECTIVE BOX
[facility], Division of Infectious Diseases  HANANE Melchor A. Lee    JANINE, NANNETTE  79y, Female  [id number]    HPI--  79F with dementia. She is awake/alert but does not responds to questions appropriately and speech, while using real words, frequently makes no sense whatsoever.    As per H&P HPI:  Pt is a 79F with PMHx Alzheimer's dementia, breast cancer, who presents to ED with frequent falls, inability to ambulate. Hx obtained from pt's daughter Isidra, who is her caretaker. Pt states pt had two falls yesterday, 1 witnessed w/ head strike, no LOC, 1 unwitnessed. States pt was fine yesterday, ambulating well. Demented at baseline, but usually steady on her feet. Denies signs of fevers, chills, n/v/d in her mother at home. Daughter states that her son-in-law tested COVID (+) and was self quarantining in different floor of the house, and had no contact with her mother. States mother also had episode of incontinence which was unusual for her, but states this happened while pt was on the floor. GOC d/w pt's daughter who states that her and her family are in agreement for pt DNR/DNI status, although pt does not have official HCP or MOLST filled.    In the ED: CTH, CT A/P, CT c-spine, all WNL. CT Chest showing RML nodule. Xray full body series negative for acute f/x. NLR 9. RLE US neg for DVT. Pt was given 1x dose azithro, 1L NS bolus in ED. (26 Mar 2020 22:34)    PCR performed for COVID-19, now known to be positive.   Patient is completely comfortable on RA. Has fevers. Blood cx thus far NTD. U/A without significant pyuria, no urine cx back as of yet.       PMH/PSH--  Alzheimer's dementia  Breast cancer      Allergies-- NC ?reaction      Medications--  Antibiotics:   Immunologic:   Other: acetaminophen    Suspension .. PRN  acetaminophen    Suspension .. PRN  anastrozole  heparin  Injectable      Social History--  EtOH: occasional  Tobacco: former   Drug use: none known.     Family/Marital History--  +COVID contacts as above    Travel/Environmental/Occupational History:  Unable    Review of Systems:  Unable    Physical Exam--  Vital Signs: T(F): 98.5 (03-28-20 @ 10:39), Max: 101.6 (03-27-20 @ 18:45)  HR: 78 (03-28-20 @ 05:04)  BP: 135/69 (03-28-20 @ 05:04)  RR: 18 (03-28-20 @ 05:04)  SpO2: 96% (03-28-20 @ 14:57)  Wt(kg): --  General: Nontoxic-appearing Female in no acute distress.  HEENT: AT/NC.  Anicteric. Conjunctiva pink and moist. Oropharynx clear.  Neck: Not rigid. No sense of mass.  Nodes: None palpable.  Lungs: Clear bilaterally without rales, wheezing or rhonchi  Heart: Regular rate and rhythm. No Murmur. No rub. No gallop. No palpable thrill.  Abdomen: Bowel sounds present and normoactive. Soft. Nondistended. Nontender. No sense of mass. No organomegaly.  Back: No spinal tenderness. No costovertebral angle tenderness.   Extremities: No cyanosis or clubbing. No edema.   Skin: Warm. Dry. Good turgor. No rash. No vasculitic stigmata.  Psychiatric: Appropriate affect and mood for situation.         Laboratory & Imaging Data--  CBC                        11.4   8.64  )-----------( 140      ( 28 Mar 2020 09:31 )             36.3       Chemistries  03-28    142  |  110<H>  |  30<H>  ----------------------------<  88  4.1   |  25  |  0.98    Ca    8.8      28 Mar 2020 09:31  Mg     2.2     03-28    Urinalysis (03.27.20 @ 20:30)    Glucose Qualitative, Urine: Negative    Blood, Urine: Large    pH Urine: 5.0    Color: Yellow    Urine Appearance: Slightly Turbid    Bilirubin: Negative    Ketone - Urine: Trace    Specific Gravity: 1.025    Protein, Urine: 100    Urobilinogen: Negative    Nitrite: Negative    Leukocyte Esterase Concentration: Negative  Urine Microscopic-Add On (NC) (03.27.20 @ 20:30)    Red Blood Cell - Urine: 0-2 /HPF    White Blood Cell - Urine: 3-5    Hyaline Casts: 0-2 /LPF    Epithelial Cells: Few    Granular Cast: 0-2 /LPF    Bacteria: Occasional    COVID-19 Related Labs:  Auto Neutrophil #: 6.71 K/uL (03-28-20 @ 09:31)  Auto Lymphocyte #: 0.95 K/uL (03-28-20 @ 09:31)      Culture Data  Culture - Blood (collected 27 Mar 2020 12:05)  Source: .Blood Blood-Peripheral  Preliminary Report (28 Mar 2020 13:02):    No growth to date.    Culture - Blood (collected 27 Mar 2020 12:05)  Source: .Blood Blood-Peripheral  Preliminary Report (28 Mar 2020 13:02):    No growth to date.    < from: CT Chest No Cont (03.26.20 @ 16:36) >    EXAM:  CT ABDOMEN AND PELVIS                          EXAM:  CT CHEST                            PROCEDURE DATE:  03/26/2020          INTERPRETATION:  CLINICAL INFORMATION: 79-year-old female status post fall and history of COVID 19 exposure    COMPARISON: None.    PROCEDURE:   CT of the Chest, Abdomen and Pelvis was performed without intravenous contrast.   Intravenous contrast: None.  Oral contrast: None.  Sagittal and coronal reformats were performed.    FINDINGS:    CHEST:     LUNGS AND LARGE AIRWAYS: Patent central airways. 7 mm right middle lobe groundglass nodule (2:46). Suggest follow-up in 6 months  PLEURA: No pleural effusion.  VESSELS: Within normal limits.  HEART: Heart size is normal. No pericardial effusion.  MEDIASTINUM AND CORY: No lymphadenopathy.  CHEST WALL AND LOWER NECK: Within normal limits.    ABDOMEN AND PELVIS:    LIVER: Within normal limits.  BILE DUCTS: Normal caliber.  GALLBLADDER: Within normal limits.  SPLEEN: Within normal limits.  PANCREAS: Within normal limits.  ADRENALS: Within normal limits.  KIDNEYS/URETERS: Within normal limits.    BLADDER: Within normal limits.  REPRODUCTIVE ORGANS: Uterus and adnexa within normal limits.    BOWEL: No bowel obstruction. Appendix normal. Multiple sigmoid diverticula without diverticulitis.  PERITONEUM: No ascites.  VESSELS: Within normal limits.  RETROPERITONEUM/LYMPH NODES: No lymphadenopathy.    ABDOMINAL WALL: Lipoma right gluteal muscles.  BONES: Sigmoid scoliosis thoracolumbar spine. Mild kyphosis    IMPRESSION:     Right middle lobe nodule. Suggest follow-up in 6 months.  No acute finding.  ANGÉLICA MCGARRY M.D., ATTENDING RADIOLOGIST  This document has been electronically signed. Mar 26 2020  4:59PM  < end of copied text >    < from: CT Lower Extremity No Cont, Right (03.27.20 @ 10:25) >    EXAM:  CT LWR EXT RT                            PROCEDURE DATE:  03/27/2020          INTERPRETATION:  CT LOWER EXTREMITY RIGHT dated 3/27/2020 10:25 AM     INDICATION: Right-sided pain after fall. Inability to ambulate.    COMPARISON: Pelvic radiograph dated 3/26/2020. CT abdomen and pelvis dated 3/26/2020    TECHNIQUE: CT imaging of the right lower extremity was performed. The data was reformatted in the axial, coronal, and sagittal planes. Additionally, 3-D reformatted imaging was created at a separate workstation.    FINDINGS:    OSSEOUS STRUCTURES: Moderately decreased bone mineralization limits detailed evaluation. Given this limitation, no displaced fracture is identified. There is mild to moderate hip joint space narrowing with mild spurring along the superolateral acetabulum. Relative preservation the pubic symphysis and right sacroiliac joint. Mild diffuse narrowing of the knee joint space is worst medially. Marginal spurring appreciated.  SYNOVIUM/ JOINT FLUID: Moderate knee joint effusion. No popliteal cyst. No large hip joint effusion.  TENDONS: Moderate enthesopathy at the proximal hamstring tendon. Spurring at the superior patella at quadriceps insertion.  MUSCLES: Normal muscle bulk. No intramuscular hematoma. There is a lipomatous mass measuring 3.2 x 8.8 x 7.1 cm between the gluteus adrián and medius musculature splaying the musculature. There are several thin fibrovascular strands noted.  NEUROVASCULAR STRUCTURES: Scattered vascular calcifications appreciated.  INTRAPELVIC SOFT TISSUES: Colonic diverticulosis.  SUBCUTANEOUS SOFT TISSUES: No soft tissue swelling.    3-D reformatted imaging confirms these findings.    IMPRESSION:    1.  Limited by decreased bone mineralization. No displaced fracture. If concern for occult fracture persists, MRI or bone scan can be performed.  2.  Degenerative changes.  3.  Lipomatous mass centered between the gluteus adrián and gluteus medius on the left. Findings may represent a lipoma. An atypical lipomatous mass cannot be excluded given the larger size and patient's age.    ABDIRAHMAN LAMA M.D., ATTENDING RADIOLOGIST  This document has been electronically signed. Mar 27 2020 10:49AM    < end of copied text >  < from: US Duplex Venous Lower Ext Ltd, Right (03.26.20 @ 21:56) >    IMPRESSION:     No evidence of right lower extremity deep venous thrombosis.    < end of copied text >      < from: Xray Chest 1 View- PORTABLE-Routine (03.27.20 @ 08:43) >  IMPRESSION: Linear atelectasis within the left lung base.    < end of copied text >

## 2020-03-28 NOTE — DISCHARGE NOTE PROVIDER - CARE PROVIDER_API CALL
Sarah Neal)  Internal Medicine  [address] [place] [id number]  Phone: [phone]  Fax: [phone]  Follow Up Time: 1 week

## 2020-03-28 NOTE — DIETITIAN INITIAL EVALUATION ADULT. - OTHER INFO
Pt with dementia. Per dtr, eats fairly well, but becoming picky with foods, doesn't eat full meals but snacks often,takes Ensure each day. Maintaining wt, dentures at home, placed on mechanical soft diet. Food preferences obtained from dtr. Likes soups, yogurt, milkshakes-will add. Sacral st 1 pressure injury. Usually feeds herself, but per dtr needed assist yesterday. Family does not want TF for pt. #, admit 143#, 5'4". BMI with UBW 22.

## 2020-03-29 LAB
ANION GAP SERPL CALC-SCNC: 8 MMOL/L — SIGNIFICANT CHANGE UP (ref 5–17)
BASOPHILS # BLD AUTO: 0.02 K/UL — SIGNIFICANT CHANGE UP (ref 0–0.2)
BASOPHILS NFR BLD AUTO: 0.4 % — SIGNIFICANT CHANGE UP (ref 0–2)
BUN SERPL-MCNC: 25 MG/DL — HIGH (ref 7–23)
CALCIUM SERPL-MCNC: 8 MG/DL — LOW (ref 8.5–10.1)
CHLORIDE SERPL-SCNC: 108 MMOL/L — SIGNIFICANT CHANGE UP (ref 96–108)
CO2 SERPL-SCNC: 25 MMOL/L — SIGNIFICANT CHANGE UP (ref 22–31)
CREAT SERPL-MCNC: 0.79 MG/DL — SIGNIFICANT CHANGE UP (ref 0.5–1.3)
CRP SERPL-MCNC: 5.13 MG/DL — HIGH (ref 0–0.4)
EOSINOPHIL # BLD AUTO: 0 K/UL — SIGNIFICANT CHANGE UP (ref 0–0.5)
EOSINOPHIL NFR BLD AUTO: 0 % — SIGNIFICANT CHANGE UP (ref 0–6)
ERYTHROCYTE [SEDIMENTATION RATE] IN BLOOD: 38 MM/HR — HIGH (ref 0–20)
FERRITIN SERPL-MCNC: 1677 NG/ML — HIGH (ref 15–150)
GLUCOSE SERPL-MCNC: 94 MG/DL — SIGNIFICANT CHANGE UP (ref 70–99)
HCT VFR BLD CALC: 34.2 % — LOW (ref 34.5–45)
HGB BLD-MCNC: 11.1 G/DL — LOW (ref 11.5–15.5)
IMM GRANULOCYTES NFR BLD AUTO: 0.9 % — SIGNIFICANT CHANGE UP (ref 0–1.5)
LYMPHOCYTES # BLD AUTO: 1.18 K/UL — SIGNIFICANT CHANGE UP (ref 1–3.3)
LYMPHOCYTES # BLD AUTO: 22.2 % — SIGNIFICANT CHANGE UP (ref 13–44)
MAGNESIUM SERPL-MCNC: 2.1 MG/DL — SIGNIFICANT CHANGE UP (ref 1.6–2.6)
MCHC RBC-ENTMCNC: 28.8 PG — SIGNIFICANT CHANGE UP (ref 27–34)
MCHC RBC-ENTMCNC: 32.5 GM/DL — SIGNIFICANT CHANGE UP (ref 32–36)
MCV RBC AUTO: 88.6 FL — SIGNIFICANT CHANGE UP (ref 80–100)
MONOCYTES # BLD AUTO: 0.65 K/UL — SIGNIFICANT CHANGE UP (ref 0–0.9)
MONOCYTES NFR BLD AUTO: 12.2 % — SIGNIFICANT CHANGE UP (ref 2–14)
NEUTROPHILS # BLD AUTO: 3.42 K/UL — SIGNIFICANT CHANGE UP (ref 1.8–7.4)
NEUTROPHILS NFR BLD AUTO: 64.3 % — SIGNIFICANT CHANGE UP (ref 43–77)
NRBC # BLD: 0 /100 WBCS — SIGNIFICANT CHANGE UP (ref 0–0)
PLATELET # BLD AUTO: 150 K/UL — SIGNIFICANT CHANGE UP (ref 150–400)
POTASSIUM SERPL-MCNC: 3.4 MMOL/L — LOW (ref 3.5–5.3)
POTASSIUM SERPL-SCNC: 3.4 MMOL/L — LOW (ref 3.5–5.3)
RBC # BLD: 3.86 M/UL — SIGNIFICANT CHANGE UP (ref 3.8–5.2)
RBC # FLD: 15.7 % — HIGH (ref 10.3–14.5)
SODIUM SERPL-SCNC: 141 MMOL/L — SIGNIFICANT CHANGE UP (ref 135–145)
WBC # BLD: 5.32 K/UL — SIGNIFICANT CHANGE UP (ref 3.8–10.5)
WBC # FLD AUTO: 5.32 K/UL — SIGNIFICANT CHANGE UP (ref 3.8–10.5)

## 2020-03-29 PROCEDURE — 99232 SBSQ HOSP IP/OBS MODERATE 35: CPT

## 2020-03-29 RX ORDER — POTASSIUM CHLORIDE 20 MEQ
40 PACKET (EA) ORAL ONCE
Refills: 0 | Status: COMPLETED | OUTPATIENT
Start: 2020-03-29 | End: 2020-03-29

## 2020-03-29 RX ADMIN — Medication 650 MILLIGRAM(S): at 06:15

## 2020-03-29 RX ADMIN — HEPARIN SODIUM 5000 UNIT(S): 5000 INJECTION INTRAVENOUS; SUBCUTANEOUS at 21:07

## 2020-03-29 RX ADMIN — HEPARIN SODIUM 5000 UNIT(S): 5000 INJECTION INTRAVENOUS; SUBCUTANEOUS at 05:31

## 2020-03-29 RX ADMIN — Medication 40 MILLIEQUIVALENT(S): at 12:29

## 2020-03-29 RX ADMIN — HEPARIN SODIUM 5000 UNIT(S): 5000 INJECTION INTRAVENOUS; SUBCUTANEOUS at 13:16

## 2020-03-29 RX ADMIN — ANASTROZOLE 1 MILLIGRAM(S): 1 TABLET ORAL at 13:16

## 2020-03-29 RX ADMIN — Medication 650 MILLIGRAM(S): at 05:32

## 2020-03-29 NOTE — PROGRESS NOTE ADULT - SUBJECTIVE AND OBJECTIVE BOX
[facility], Division of Infectious Diseases  HANANE Melchor A. Lee  [phone]    Name: NANNETTE MEHTA  Age: 79y  Gender: Female  MRN: [id number]    Interval History--  Notes reviewed. seen earlier today. Remains comfortable, not wearing her NC. Conversant but confused/confabulates.     Past Medical History--  Alzheimer's dementia  Breast cancer      For details regarding the patient's social history, family history, and other miscellaneous elements, please refer the initial infectious diseases consultation and/or the admitting history and physical examination for this admission.    Allergies    penicillin (Unknown)    Intolerances        Medications--  Antibiotics:    Immunologic:    Other:  acetaminophen    Suspension .. PRN  acetaminophen    Suspension .. PRN  anastrozole  heparin  Injectable      Review of Systems--  Review of systems unable due to dementia.     Physical Examination--  Vital Signs: T(F): 99 (03-29-20 @ 12:06), Max: 100.6 (03-29-20 @ 04:51)  HR: 84 (03-29-20 @ 12:06)  BP: 130/77 (03-29-20 @ 12:06)  RR: 18 (03-29-20 @ 12:06)  SpO2: 95% (03-29-20 @ 12:06)  Wt(kg): --  General: Nontoxic-appearing Female in no acute distress.  HEENT: AT/NC.  Anicteric. Conjunctiva pink and moist. Oropharynx clear.  Neck: Not rigid. No sense of mass.  Nodes: None palpable.  Lungs: Clear bilaterally without rales, wheezing or rhonchi  Heart: Regular rate and rhythm. No Murmur. No rub. No gallop. No palpable thrill.  Abdomen: Bowel sounds present and normoactive. Soft. Nondistended. Nontender. No sense of mass. No organomegaly.  Back: No spinal tenderness. No costovertebral angle tenderness.   Extremities: No cyanosis or clubbing. No edema.   Skin: Warm. Dry. Good turgor. No rash. No vasculitic stigmata.  Psychiatric: Appropriate affect and mood for situation (within limites of her dementia)        Laboratory Studies--  CBC                        11.1   5.32  )-----------( 150      ( 29 Mar 2020 07:15 )             34.2       Chemistries  03-29    141  |  108  |  25<H>  ----------------------------<  94  3.4<L>   |  25  |  0.79    Ca    8.0<L>      29 Mar 2020 07:15  Mg     2.1     03-29    COVID-19 Related Labs (last 5 days):  Ferritin, Serum: 1677 ng/mL (03-29-20 @ 11:39)  C-Reactive Protein, Serum: 5.13 mg/dL (03-29-20 @ 11:25)  Auto Neutrophil #: 3.42 K/uL (03-29-20 @ 07:15)  Auto Lymphocyte #: 1.18 K/uL (03-29-20 @ 07:15)  Sedimentation Rate, Erythrocyte: 38 mm/hr (03-29-20 @ 07:15)  Auto Neutrophil #: 6.71 K/uL (03-28-20 @ 09:31)  Auto Lymphocyte #: 0.95 K/uL (03-28-20 @ 09:31)  C-Reactive Protein, Serum: 9.13 mg/dL (03-27-20 @ 09:50)  COVID-19 PCR: Detected (03-27-20 @ 08:49)  Auto Neutrophil #: 7.87 K/uL (03-27-20 @ 08:20)  Auto Lymphocyte #: 0.95 K/uL (03-27-20 @ 08:20)  Sedimentation Rate, Erythrocyte: 23 mm/hr (03-27-20 @ 00:49)  Auto Neutrophil #: 9.13 K/uL (03-26-20 @ 16:17)  Auto Lymphocyte #: 0.89 K/uL (03-26-20 @ 16:17)  Prothrombin Time, Plasma: 11.8 sec (03-26-20 @ 16:17)  Activated Partial Thromboplastin Time: 26.3 sec (03-26-20 @ 16:17)        Culture Data    Culture - Urine (collected 28 Mar 2020 00:40)  Source: .Urine Clean Catch (Midstream)  Final Report (28 Mar 2020 19:36):    <10,000 CFU/mL Normal Urogenital Nga    Culture - Blood (collected 27 Mar 2020 12:05)  Source: .Blood Blood-Peripheral  Preliminary Report (28 Mar 2020 13:02):    No growth to date.    Culture - Blood (collected 27 Mar 2020 12:05)  Source: .Blood Blood-Peripheral  Preliminary Report (28 Mar 2020 13:02):    No growth to date.

## 2020-03-29 NOTE — PROGRESS NOTE ADULT - ASSESSMENT
COVID-19/SARS-CoV-2 infection.  No pneumonia  SaO2 96% on RA  Dementia    3/29  Clinically unchaned with low grade fever  NLR declined. Ferritin elevated

## 2020-03-29 NOTE — PROGRESS NOTE ADULT - PROBLEM SELECTOR PLAN 1
Precautions per protocol, ideally airborne and contact.  Supplemental oxygen as required to maintain adequate saturation.  Avoid NIPPV, high flow O2, nebulilzers, or other interventions which may increase the likelihood of aerosolization  Steroids should be reserved for indications other than COVID (e.g. refractory shock, asthma)  Vigilance for secondary infection and other superimposed events  Monitor laboratory studies

## 2020-03-29 NOTE — PROGRESS NOTE ADULT - SUBJECTIVE AND OBJECTIVE BOX
CHIEF COMPLAINT/INTERVAL HISTORY:  Pt. seen and evaluated for COVID 19 infection.  Pt. is in no distress.  Is feeling well and denies having any SOB.      REVIEW OF SYSTEMS:  No fever, CP, SOB, or abdominal pain.     Vital Signs Last 24 Hrs  T(C): 37.7 (29 Mar 2020 06:15), Max: 38.1 (29 Mar 2020 04:51)  T(F): 99.9 (29 Mar 2020 06:15), Max: 100.6 (29 Mar 2020 04:51)  HR: 73 (29 Mar 2020 04:51) (73 - 95)  BP: 113/74 (29 Mar 2020 04:51) (109/71 - 113/74)  BP(mean): --  RR: 17 (29 Mar 2020 04:51) (17 - 18)  SpO2: 97% (29 Mar 2020 04:51) (96% - 97%)    PHYSICAL EXAM:  GENERAL: NAD  HEENT: EOMI, hearing normal, conjunctiva and sclera clear  Chest: CTA bilaterally, no wheezing  CV: S1S2, RRR,   GI: soft, +BS, NT/ND  Musculoskeletal: trace right LE edema  Psychiatric: +dementia  Skin: warm and dry    LABS:                        11.1   5.32  )-----------( 150      ( 29 Mar 2020 07:15 )             34.2     03-29    141  |  108  |  25<H>  ----------------------------<  94  3.4<L>   |  25  |  0.79    Ca    8.0<L>      29 Mar 2020 07:15  Mg     2.1     -29        Urinalysis Basic - ( 27 Mar 2020 20:30 )    Color: Yellow / Appearance: Slightly Turbid / S.025 / pH: x  Gluc: x / Ketone: Trace  / Bili: Negative / Urobili: Negative   Blood: x / Protein: 100 / Nitrite: Negative   Leuk Esterase: Negative / RBC: 0-2 /HPF / WBC 3-5   Sq Epi: x / Non Sq Epi: Few / Bacteria: Occasional        Assessment and Plan:  -Falls:  CT RLE with no displaced fracture.  CT head and cervical spine: No acute fractures or dislocations.  Multilevel cervical spondylosis.  No acute intracranial hemorrhage, mass effect, or shift of the midline structures. RLE doppler negative for DVT.  X-rays with no acute fracture or bony pathology.  PT evaluation.   -Fever 2/2 COVID 19 infection:  CT C/A/P with no acute findings.  RML nodule recommending 6 month follow up imaging as outpatient.  Check blood cx, and urine cx.  Tylenol PRN.  ID f/u  -Alzheimer's dementia:  supportive care  -Hx of Breast cancer:  continue Arimidex 1mg PO daily  -VTE ppx: Heparin 5000 units SQ Q8h CHIEF COMPLAINT/INTERVAL HISTORY:  Pt. seen and evaluated for COVID 19 infection.  Pt. is in no distress.  Is feeling well and denies having any SOB.      REVIEW OF SYSTEMS:  No fever, CP, SOB, or abdominal pain.     Vital Signs Last 24 Hrs  T(C): 37.7 (29 Mar 2020 06:15), Max: 38.1 (29 Mar 2020 04:51)  T(F): 99.9 (29 Mar 2020 06:15), Max: 100.6 (29 Mar 2020 04:51)  HR: 73 (29 Mar 2020 04:51) (73 - 95)  BP: 113/74 (29 Mar 2020 04:51) (109/71 - 113/74)  BP(mean): --  RR: 17 (29 Mar 2020 04:51) (17 - 18)  SpO2: 97% (29 Mar 2020 04:51) (96% - 97%)    PHYSICAL EXAM:  GENERAL: NAD  HEENT: EOMI, hearing normal, conjunctiva and sclera clear  Chest: CTA bilaterally, no wheezing  CV: S1S2, RRR,   GI: soft, +BS, NT/ND  Musculoskeletal: trace right LE edema  Psychiatric: +dementia  Skin: warm and dry    LABS:                        11.1   5.32  )-----------( 150      ( 29 Mar 2020 07:15 )             34.2     03-29    141  |  108  |  25<H>  ----------------------------<  94  3.4<L>   |  25  |  0.79    Ca    8.0<L>      29 Mar 2020 07:15  Mg     2.1     -29        Urinalysis Basic - ( 27 Mar 2020 20:30 )    Color: Yellow / Appearance: Slightly Turbid / S.025 / pH: x  Gluc: x / Ketone: Trace  / Bili: Negative / Urobili: Negative   Blood: x / Protein: 100 / Nitrite: Negative   Leuk Esterase: Negative / RBC: 0-2 /HPF / WBC 3-5   Sq Epi: x / Non Sq Epi: Few / Bacteria: Occasional        Assessment and Plan:  -Falls:  CT RLE with no displaced fracture.  CT head and cervical spine: No acute fractures or dislocations.  Multilevel cervical spondylosis.  No acute intracranial hemorrhage, mass effect, or shift of the midline structures. RLE doppler negative for DVT.  X-rays with no acute fracture or bony pathology.  PT evaluation.   -Fever 2/2 COVID 19 infection:  CT C/A/P with no acute findings.  RML nodule recommending 6 month follow up imaging as outpatient.  Blood cx NGTD and urine cx negative.  Tylenol PRN.  ID f/u  -Alzheimer's dementia:  supportive care  -Hx of Breast cancer:  continue Arimidex 1mg PO daily  -VTE ppx: Heparin 5000 units SQ Q8h

## 2020-03-30 LAB
ANION GAP SERPL CALC-SCNC: 6 MMOL/L — SIGNIFICANT CHANGE UP (ref 5–17)
BASOPHILS # BLD AUTO: 0.03 K/UL — SIGNIFICANT CHANGE UP (ref 0–0.2)
BASOPHILS NFR BLD AUTO: 0.4 % — SIGNIFICANT CHANGE UP (ref 0–2)
BUN SERPL-MCNC: 31 MG/DL — HIGH (ref 7–23)
CALCIUM SERPL-MCNC: 8.8 MG/DL — SIGNIFICANT CHANGE UP (ref 8.5–10.1)
CHLORIDE SERPL-SCNC: 108 MMOL/L — SIGNIFICANT CHANGE UP (ref 96–108)
CO2 SERPL-SCNC: 27 MMOL/L — SIGNIFICANT CHANGE UP (ref 22–31)
CREAT SERPL-MCNC: 0.86 MG/DL — SIGNIFICANT CHANGE UP (ref 0.5–1.3)
EOSINOPHIL # BLD AUTO: 0.02 K/UL — SIGNIFICANT CHANGE UP (ref 0–0.5)
EOSINOPHIL NFR BLD AUTO: 0.3 % — SIGNIFICANT CHANGE UP (ref 0–6)
GLUCOSE SERPL-MCNC: 94 MG/DL — SIGNIFICANT CHANGE UP (ref 70–99)
HCT VFR BLD CALC: 35.2 % — SIGNIFICANT CHANGE UP (ref 34.5–45)
HGB BLD-MCNC: 11.3 G/DL — LOW (ref 11.5–15.5)
IMM GRANULOCYTES NFR BLD AUTO: 1 % — SIGNIFICANT CHANGE UP (ref 0–1.5)
LYMPHOCYTES # BLD AUTO: 1.3 K/UL — SIGNIFICANT CHANGE UP (ref 1–3.3)
LYMPHOCYTES # BLD AUTO: 18.8 % — SIGNIFICANT CHANGE UP (ref 13–44)
MCHC RBC-ENTMCNC: 28.6 PG — SIGNIFICANT CHANGE UP (ref 27–34)
MCHC RBC-ENTMCNC: 32.1 GM/DL — SIGNIFICANT CHANGE UP (ref 32–36)
MCV RBC AUTO: 89.1 FL — SIGNIFICANT CHANGE UP (ref 80–100)
MONOCYTES # BLD AUTO: 0.85 K/UL — SIGNIFICANT CHANGE UP (ref 0–0.9)
MONOCYTES NFR BLD AUTO: 12.3 % — SIGNIFICANT CHANGE UP (ref 2–14)
NEUTROPHILS # BLD AUTO: 4.63 K/UL — SIGNIFICANT CHANGE UP (ref 1.8–7.4)
NEUTROPHILS NFR BLD AUTO: 67.2 % — SIGNIFICANT CHANGE UP (ref 43–77)
NRBC # BLD: 0 /100 WBCS — SIGNIFICANT CHANGE UP (ref 0–0)
PLATELET # BLD AUTO: 173 K/UL — SIGNIFICANT CHANGE UP (ref 150–400)
POTASSIUM SERPL-MCNC: 3.5 MMOL/L — SIGNIFICANT CHANGE UP (ref 3.5–5.3)
POTASSIUM SERPL-SCNC: 3.5 MMOL/L — SIGNIFICANT CHANGE UP (ref 3.5–5.3)
RBC # BLD: 3.95 M/UL — SIGNIFICANT CHANGE UP (ref 3.8–5.2)
RBC # FLD: 15.6 % — HIGH (ref 10.3–14.5)
SODIUM SERPL-SCNC: 141 MMOL/L — SIGNIFICANT CHANGE UP (ref 135–145)
WBC # BLD: 6.9 K/UL — SIGNIFICANT CHANGE UP (ref 3.8–10.5)
WBC # FLD AUTO: 6.9 K/UL — SIGNIFICANT CHANGE UP (ref 3.8–10.5)

## 2020-03-30 PROCEDURE — 99232 SBSQ HOSP IP/OBS MODERATE 35: CPT

## 2020-03-30 RX ADMIN — ANASTROZOLE 1 MILLIGRAM(S): 1 TABLET ORAL at 13:17

## 2020-03-30 RX ADMIN — HEPARIN SODIUM 5000 UNIT(S): 5000 INJECTION INTRAVENOUS; SUBCUTANEOUS at 05:26

## 2020-03-30 RX ADMIN — HEPARIN SODIUM 5000 UNIT(S): 5000 INJECTION INTRAVENOUS; SUBCUTANEOUS at 23:02

## 2020-03-30 RX ADMIN — HEPARIN SODIUM 5000 UNIT(S): 5000 INJECTION INTRAVENOUS; SUBCUTANEOUS at 13:17

## 2020-03-30 NOTE — PROGRESS NOTE ADULT - SUBJECTIVE AND OBJECTIVE BOX
CHIEF COMPLAINT/INTERVAL HISTORY:  Pt. seen and evaluated for COVID 19 infection.  Pt. is in no distress.  Denies having any SOB.  On room air.      REVIEW OF SYSTEMS:  No fever, CP, SOB, or abdominal pain     Vital Signs Last 24 Hrs  T(C): 36.8 (30 Mar 2020 05:21), Max: 37.3 (29 Mar 2020 20:48)  T(F): 98.2 (30 Mar 2020 05:21), Max: 99.1 (29 Mar 2020 20:48)  HR: 88 (30 Mar 2020 05:21) (70 - 88)  BP: 163/82 (30 Mar 2020 05:21) (126/76 - 163/82)  BP(mean): --  RR: 18 (30 Mar 2020 05:21) (18 - 18)  SpO2: 94% (30 Mar 2020 05:21) (83% - 95%)    PHYSICAL EXAM:  GENERAL: NAD  HEENT: EOMI, hearing normal, conjunctiva and sclera clear  Chest: CTA bilaterally, no wheezing  CV: S1S2, RRR,   GI: soft, +BS, NT/ND  Musculoskeletal: trace right LE edema  Psychiatric: affect nL, mood nL  Skin: warm and dry    LABS:                        11.3   6.90  )-----------( 173      ( 30 Mar 2020 10:12 )             35.2     03-30    141  |  108  |  31<H>  ----------------------------<  94  3.5   |  27  |  0.86    Ca    8.8      30 Mar 2020 10:12  Mg     2.1     03-29      Assessment and Plan:  -Falls:  CT RLE with no displaced fracture.  CT head and cervical spine: No acute fractures or dislocations.  Multilevel cervical spondylosis.  No acute intracranial hemorrhage, mass effect, or shift of the midline structures. RLE doppler negative for DVT.  X-rays with no acute fracture or bony pathology.  PT follow up.  Possible JOSE.   -Fever 2/2 COVID 19 infection:  CT C/A/P with no acute findings.  L nodule recommending 6 month follow up imaging as outpatient.  Blood cx NGTD and urine cx negative.  Tylenol PRN.  ID f/u  -Alzheimer's dementia:  supportive care  -Hx of Breast cancer:  continue Arimidex 1mg PO daily  -VTE ppx: Heparin 5000 units SQ Q8h

## 2020-03-30 NOTE — PROGRESS NOTE ADULT - PROBLEM SELECTOR PLAN 1
Precautions per protocol, ideally airborne and contact.  Supplemental oxygen as required to maintain adequate saturation.  Avoid NIPPV, high flow O2, nebulilzers, or other interventions which may increase the likelihood of aerosolization  Steroids should be reserved for indications other than COVID (e.g. refractory shock, asthma)  Vigilance for secondary infection and other superimposed events  Monitor laboratory studies  on discharge to be quarantined at home 2 weeks after + test and asymptomatic for 3 days

## 2020-03-30 NOTE — PROGRESS NOTE ADULT - SUBJECTIVE AND OBJECTIVE BOX
[facility], Division of Infectious Diseases  HANANE Melchor A. Lee  [phone]  Name: NANNETTE MEHTA  Age: 79y  Gender: Female  MRN: [id number]    Interval History--  Notes reviewed  pleasantly confused      Past Medical History--  Alzheimer's dementia  Breast cancer      For details regarding the patient's social history, family history, and other miscellaneous elements, please refer the initial infectious diseases consultation and/or the admitting history and physical examination for this admission.    Allergies    penicillin (Unknown)    Intolerances        Medications--  Antibiotics:    Immunologic:    Other:  acetaminophen    Suspension .. PRN  acetaminophen    Suspension .. PRN  anastrozole  heparin  Injectable      Review of Systems--  A 10-point review of systems was obtained.     unable to obtain  Review of systems otherwise negative except as previously noted.    Physical Examination--  Vital Signs: T(F): 98.1 (03-30-20 @ 13:22), Max: 99.1 (03-29-20 @ 20:48)  HR: 67 (03-30-20 @ 13:22)  BP: 124/73 (03-30-20 @ 13:22)  RR: 18 (03-30-20 @ 05:21)  SpO2: 95% (03-30-20 @ 13:22)  Wt(kg): --  General: Nontoxic-appearing Female in no acute distress.  HEENT: AT/NC.  Anicteric. Conjunctiva pink and moist.   Neck: Not rigid. No sense of mass.  Nodes: None palpable.  Lungs: Clear bilaterally without rales, wheezing or rhonchi  Heart: Regular rate and rhythm. No Murmur  Abdomen: Bowel sounds present and normoactive. Soft. Nondistende  Extremities: No cyanosis or clubbing. No edema.   Skin: Warm. Dry. Good turgor. No rash. No vasculitic stigmata.  Psychiatric: Appropriate affect and mood for situation.         Laboratory Studies--  CBC                        11.3   6.90  )-----------( 173      ( 30 Mar 2020 10:12 )             35.2       Chemistries  03-30    141  |  108  |  31<H>  ----------------------------<  94  3.5   |  27  |  0.86    Ca    8.8      30 Mar 2020 10:12  Mg     2.1     03-29        Culture Data    Culture - Urine (collected 28 Mar 2020 00:40)  Source: .Urine Clean Catch (Midstream)  Final Report (28 Mar 2020 19:36):    <10,000 CFU/mL Normal Urogenital Nga    Culture - Blood (collected 27 Mar 2020 12:05)  Source: .Blood Blood-Peripheral  Preliminary Report (28 Mar 2020 13:02):    No growth to date.    Culture - Blood (collected 27 Mar 2020 12:05)  Source: .Blood Blood-Peripheral  Preliminary Report (28 Mar 2020 13:02):    No growth to date.      COVID-19 PCR . (03.27.20 @ 08:49)    COVID-19 PCR: Detected: All “detected” results on this new test are considered presumptively  positive results, are clinically actionable, and specimens will be  forwarded to [facility][note 3] for confirmation testing.  Another report (corrected report) will only be issued if discordant  results occur.  This test has been validated by [facility] to be accurate;  though it has not been FDA cleared/approved by the usual pathway.  As with all laboratory tests, results should be correlated with clinical  findings.

## 2020-03-30 NOTE — PROGRESS NOTE ADULT - ASSESSMENT
COVID-19/SARS-CoV-2 infection.  No pneumonia  SaO2 96% on RA  Dementia  clinically much better on RA

## 2020-03-31 LAB
ANION GAP SERPL CALC-SCNC: 6 MMOL/L — SIGNIFICANT CHANGE UP (ref 5–17)
BASOPHILS # BLD AUTO: 0.03 K/UL — SIGNIFICANT CHANGE UP (ref 0–0.2)
BASOPHILS NFR BLD AUTO: 0.5 % — SIGNIFICANT CHANGE UP (ref 0–2)
BUN SERPL-MCNC: 23 MG/DL — SIGNIFICANT CHANGE UP (ref 7–23)
CALCIUM SERPL-MCNC: 8.5 MG/DL — SIGNIFICANT CHANGE UP (ref 8.5–10.1)
CHLORIDE SERPL-SCNC: 110 MMOL/L — HIGH (ref 96–108)
CO2 SERPL-SCNC: 27 MMOL/L — SIGNIFICANT CHANGE UP (ref 22–31)
CREAT SERPL-MCNC: 0.68 MG/DL — SIGNIFICANT CHANGE UP (ref 0.5–1.3)
CRP SERPL-MCNC: 2.63 MG/DL — HIGH (ref 0–0.4)
CULTURE RESULTS: SIGNIFICANT CHANGE UP
CULTURE RESULTS: SIGNIFICANT CHANGE UP
EOSINOPHIL # BLD AUTO: 0.07 K/UL — SIGNIFICANT CHANGE UP (ref 0–0.5)
EOSINOPHIL NFR BLD AUTO: 1.2 % — SIGNIFICANT CHANGE UP (ref 0–6)
ERYTHROCYTE [SEDIMENTATION RATE] IN BLOOD: 42 MM/HR — HIGH (ref 0–20)
FERRITIN SERPL-MCNC: 1601 NG/ML — HIGH (ref 15–150)
GLUCOSE SERPL-MCNC: 94 MG/DL — SIGNIFICANT CHANGE UP (ref 70–99)
HCT VFR BLD CALC: 35.1 % — SIGNIFICANT CHANGE UP (ref 34.5–45)
HGB BLD-MCNC: 11.2 G/DL — LOW (ref 11.5–15.5)
IMM GRANULOCYTES NFR BLD AUTO: 1.2 % — SIGNIFICANT CHANGE UP (ref 0–1.5)
LYMPHOCYTES # BLD AUTO: 1.5 K/UL — SIGNIFICANT CHANGE UP (ref 1–3.3)
LYMPHOCYTES # BLD AUTO: 26.5 % — SIGNIFICANT CHANGE UP (ref 13–44)
MCHC RBC-ENTMCNC: 28.5 PG — SIGNIFICANT CHANGE UP (ref 27–34)
MCHC RBC-ENTMCNC: 31.9 GM/DL — LOW (ref 32–36)
MCV RBC AUTO: 89.3 FL — SIGNIFICANT CHANGE UP (ref 80–100)
MONOCYTES # BLD AUTO: 0.73 K/UL — SIGNIFICANT CHANGE UP (ref 0–0.9)
MONOCYTES NFR BLD AUTO: 12.9 % — SIGNIFICANT CHANGE UP (ref 2–14)
NEUTROPHILS # BLD AUTO: 3.25 K/UL — SIGNIFICANT CHANGE UP (ref 1.8–7.4)
NEUTROPHILS NFR BLD AUTO: 57.7 % — SIGNIFICANT CHANGE UP (ref 43–77)
NRBC # BLD: 0 /100 WBCS — SIGNIFICANT CHANGE UP (ref 0–0)
PLATELET # BLD AUTO: 183 K/UL — SIGNIFICANT CHANGE UP (ref 150–400)
POTASSIUM SERPL-MCNC: 3.8 MMOL/L — SIGNIFICANT CHANGE UP (ref 3.5–5.3)
POTASSIUM SERPL-SCNC: 3.8 MMOL/L — SIGNIFICANT CHANGE UP (ref 3.5–5.3)
RBC # BLD: 3.93 M/UL — SIGNIFICANT CHANGE UP (ref 3.8–5.2)
RBC # FLD: 15.7 % — HIGH (ref 10.3–14.5)
SODIUM SERPL-SCNC: 143 MMOL/L — SIGNIFICANT CHANGE UP (ref 135–145)
SPECIMEN SOURCE: SIGNIFICANT CHANGE UP
SPECIMEN SOURCE: SIGNIFICANT CHANGE UP
WBC # BLD: 5.65 K/UL — SIGNIFICANT CHANGE UP (ref 3.8–10.5)
WBC # FLD AUTO: 5.65 K/UL — SIGNIFICANT CHANGE UP (ref 3.8–10.5)

## 2020-03-31 PROCEDURE — 99232 SBSQ HOSP IP/OBS MODERATE 35: CPT

## 2020-03-31 RX ADMIN — ANASTROZOLE 1 MILLIGRAM(S): 1 TABLET ORAL at 13:23

## 2020-03-31 RX ADMIN — HEPARIN SODIUM 5000 UNIT(S): 5000 INJECTION INTRAVENOUS; SUBCUTANEOUS at 21:41

## 2020-03-31 RX ADMIN — HEPARIN SODIUM 5000 UNIT(S): 5000 INJECTION INTRAVENOUS; SUBCUTANEOUS at 05:24

## 2020-03-31 RX ADMIN — HEPARIN SODIUM 5000 UNIT(S): 5000 INJECTION INTRAVENOUS; SUBCUTANEOUS at 13:23

## 2020-03-31 NOTE — PROGRESS NOTE ADULT - SUBJECTIVE AND OBJECTIVE BOX
CHIEF COMPLAINT/INTERVAL HISTORY:  Pt. seen and evaluated for COVID 19 infection.  Pt. is in no distress.  Denies SOB.  Remains on room air.      REVIEW OF SYSTEMS:  No fever, CP, SOB, or abdominal pain.     Vital Signs Last 24 Hrs  T(C): 37.4 (31 Mar 2020 05:15), Max: 37.4 (31 Mar 2020 05:15)  T(F): 99.3 (31 Mar 2020 05:15), Max: 99.3 (31 Mar 2020 05:15)  HR: 67 (30 Mar 2020 13:22) (67 - 67)  BP: 112/65 (31 Mar 2020 05:15) (104/69 - 124/73)  BP(mean): --  RR: 17 (31 Mar 2020 05:15) (17 - 17)  SpO2: 91% (31 Mar 2020 05:15) (91% - 95%)    PHYSICAL EXAM:  GENERAL: NAD  HEENT: EOMI, hearing normal, conjunctiva and sclera clear  Chest: CTA bilaterally, no wheezing  CV: S1S2, RRR,   GI: soft, +BS, NT/ND  Musculoskeletal: trace right LE edema  Psychiatric: +dementia  Skin: warm and dry    LABS:                        11.2   5.65  )-----------( 183      ( 31 Mar 2020 08:21 )             35.1     03-31    143  |  110<H>  |  23  ----------------------------<  94  3.8   |  27  |  0.68    Ca    8.5      31 Mar 2020 08:21      Assessment and Plan:  -Falls:  CT RLE with no displaced fracture.  CT head and cervical spine: No acute fractures or dislocations.  Multilevel cervical spondylosis.  No acute intracranial hemorrhage, mass effect, or shift of the midline structures. RLE doppler negative for DVT.  X-rays with no acute fracture or bony pathology.  PT recommending JOSE.   -Fever 2/2 COVID 19 infection:  CT C/A/P with no acute findings.  RML nodule recommending 6 month follow up imaging as outpatient.  Blood cx NGTD and urine cx negative.  Tylenol PRN.  ID f/u  -Alzheimer's dementia:  supportive care  -Hx of Breast cancer:  continue Arimidex 1mg PO daily  -VTE ppx: Heparin 5000 units SQ Q8h

## 2020-04-01 LAB
ANION GAP SERPL CALC-SCNC: 7 MMOL/L — SIGNIFICANT CHANGE UP (ref 5–17)
BASOPHILS # BLD AUTO: 0.04 K/UL — SIGNIFICANT CHANGE UP (ref 0–0.2)
BASOPHILS NFR BLD AUTO: 0.5 % — SIGNIFICANT CHANGE UP (ref 0–2)
BUN SERPL-MCNC: 20 MG/DL — SIGNIFICANT CHANGE UP (ref 7–23)
CALCIUM SERPL-MCNC: 9 MG/DL — SIGNIFICANT CHANGE UP (ref 8.5–10.1)
CHLORIDE SERPL-SCNC: 110 MMOL/L — HIGH (ref 96–108)
CO2 SERPL-SCNC: 28 MMOL/L — SIGNIFICANT CHANGE UP (ref 22–31)
CREAT SERPL-MCNC: 0.79 MG/DL — SIGNIFICANT CHANGE UP (ref 0.5–1.3)
EOSINOPHIL # BLD AUTO: 0.09 K/UL — SIGNIFICANT CHANGE UP (ref 0–0.5)
EOSINOPHIL NFR BLD AUTO: 1.1 % — SIGNIFICANT CHANGE UP (ref 0–6)
GLUCOSE SERPL-MCNC: 104 MG/DL — HIGH (ref 70–99)
HCT VFR BLD CALC: 35 % — SIGNIFICANT CHANGE UP (ref 34.5–45)
HGB BLD-MCNC: 11.3 G/DL — LOW (ref 11.5–15.5)
IMM GRANULOCYTES NFR BLD AUTO: 1.5 % — SIGNIFICANT CHANGE UP (ref 0–1.5)
LYMPHOCYTES # BLD AUTO: 1.26 K/UL — SIGNIFICANT CHANGE UP (ref 1–3.3)
LYMPHOCYTES # BLD AUTO: 14.8 % — SIGNIFICANT CHANGE UP (ref 13–44)
MAGNESIUM SERPL-MCNC: 2 MG/DL — SIGNIFICANT CHANGE UP (ref 1.6–2.6)
MCHC RBC-ENTMCNC: 29.1 PG — SIGNIFICANT CHANGE UP (ref 27–34)
MCHC RBC-ENTMCNC: 32.3 GM/DL — SIGNIFICANT CHANGE UP (ref 32–36)
MCV RBC AUTO: 90.2 FL — SIGNIFICANT CHANGE UP (ref 80–100)
MONOCYTES # BLD AUTO: 0.87 K/UL — SIGNIFICANT CHANGE UP (ref 0–0.9)
MONOCYTES NFR BLD AUTO: 10.2 % — SIGNIFICANT CHANGE UP (ref 2–14)
NEUTROPHILS # BLD AUTO: 6.1 K/UL — SIGNIFICANT CHANGE UP (ref 1.8–7.4)
NEUTROPHILS NFR BLD AUTO: 71.9 % — SIGNIFICANT CHANGE UP (ref 43–77)
NRBC # BLD: 0 /100 WBCS — SIGNIFICANT CHANGE UP (ref 0–0)
PLATELET # BLD AUTO: 219 K/UL — SIGNIFICANT CHANGE UP (ref 150–400)
POTASSIUM SERPL-MCNC: 3.5 MMOL/L — SIGNIFICANT CHANGE UP (ref 3.5–5.3)
POTASSIUM SERPL-SCNC: 3.5 MMOL/L — SIGNIFICANT CHANGE UP (ref 3.5–5.3)
RBC # BLD: 3.88 M/UL — SIGNIFICANT CHANGE UP (ref 3.8–5.2)
RBC # FLD: 15.1 % — HIGH (ref 10.3–14.5)
SODIUM SERPL-SCNC: 145 MMOL/L — SIGNIFICANT CHANGE UP (ref 135–145)
WBC # BLD: 8.49 K/UL — SIGNIFICANT CHANGE UP (ref 3.8–10.5)
WBC # FLD AUTO: 8.49 K/UL — SIGNIFICANT CHANGE UP (ref 3.8–10.5)

## 2020-04-01 PROCEDURE — 99232 SBSQ HOSP IP/OBS MODERATE 35: CPT

## 2020-04-01 RX ADMIN — ANASTROZOLE 1 MILLIGRAM(S): 1 TABLET ORAL at 12:22

## 2020-04-01 RX ADMIN — HEPARIN SODIUM 5000 UNIT(S): 5000 INJECTION INTRAVENOUS; SUBCUTANEOUS at 12:22

## 2020-04-01 RX ADMIN — HEPARIN SODIUM 5000 UNIT(S): 5000 INJECTION INTRAVENOUS; SUBCUTANEOUS at 06:27

## 2020-04-01 RX ADMIN — HEPARIN SODIUM 5000 UNIT(S): 5000 INJECTION INTRAVENOUS; SUBCUTANEOUS at 21:15

## 2020-04-01 NOTE — PROGRESS NOTE ADULT - PROBLEM SELECTOR PLAN 1
Acute respiratory failure 2/2 PNA in setting of suspected COVID19 viral illness given clinical presentation.   - COVID +, CT C/A/P with no acute findings.   -Blood cx NGTD and urine cx negative.   - Tylenol PRN.   - ID f/u  - NLR 4.8, low risk for decompensation   - Uncertain about # of days w/ symptoms    - continue O2 NC, may use NRB. Currently sat well on RA   -  AVOID HFNC/Bipap/Nebs  - Contact and droplet isolation precautions  - Check CRP, ferritin (correlates with severity/mortality)  - Tylenol prn for fever  - may use albuterol/ipratroprium inhaler  - Start supplemental oxygen therapy immediately if pt has respiratory distress target SpO2 > 94%  - monitor respiratory status closely  - Code Status: Full code

## 2020-04-01 NOTE — PROGRESS NOTE ADULT - PROBLEM SELECTOR PLAN 2
CT RLE with no displaced fracture.   CT head and cervical spine: No acute fractures or dislocations.    Multilevel cervical spondylosis.  No acute intracranial hemorrhage, mass effect, or shift of the midline structures.   RLE doppler negative for DVT.    X-rays with no acute fracture or bony pathology.    PT recommending JOSE. CT RLE with no displaced fracture.   CT head and cervical spine: No acute fractures or dislocations.    Multilevel cervical spondylosis.  No acute intracranial hemorrhage, mass effect, or shift of the midline structures.   RLE doppler negative for DVT.    X-rays with no acute fracture or bony pathology.    PT recommending JOSE- spoke with SW- will reach out to family for choices

## 2020-04-01 NOTE — PROGRESS NOTE ADULT - SUBJECTIVE AND OBJECTIVE BOX
*************CHARTING IN PROGRESS*****************  Patient is a 79y old  Female who presents with a chief complaint of fall (31 Mar 2020 11:34)      INTERVAL HPI/OVERNIGHT EVENTS: Patient seen and examined at bedside.    MEDICATIONS  (STANDING):  anastrozole 1 milliGRAM(s) Oral daily  heparin  Injectable 5000 Unit(s) SubCutaneous every 8 hours    MEDICATIONS  (PRN):  acetaminophen    Suspension .. 650 milliGRAM(s) Oral every 6 hours PRN Mild Pain (1 - 3)  acetaminophen    Suspension .. 650 milliGRAM(s) Oral every 6 hours PRN Temp greater or equal to 38C (100.4F)      Allergies    penicillin (Unknown)    Intolerances        REVIEW OF SYSTEMS:  CONSTITUTIONAL: No fever or chills  HEENT: No headache, no sore throat  RESPIRATORY: No cough, wheezing, or shortness of breath  CARDIOVASCULAR: No chest pain, palpitations, or leg swelling  GASTROINTESTINAL: No abd pain, nausea, vomiting, or diarrhea  GENITOURINARY: No dysuria, frequency, or hematuria  NEUROLOGICAL: No focal weakness or dizziness  MUSCULOSKELETAL: No myalgias     Vital Signs Last 24 Hrs  T(C): 37.1 (01 Apr 2020 04:53), Max: 37.4 (31 Mar 2020 20:19)  T(F): 98.8 (01 Apr 2020 04:53), Max: 99.3 (31 Mar 2020 20:19)  HR: 80 (01 Apr 2020 04:53) (73 - 87)  BP: 131/74 (01 Apr 2020 04:53) (112/69 - 150/81)  BP(mean): --  RR: 17 (01 Apr 2020 04:53) (17 - 18)  SpO2: 95% (01 Apr 2020 04:53) (93% - 95%)    PHYSICAL EXAM:  GENERAL: NAD  HEENT: EOMI, moist mucous membranes  CHEST/LUNG: CTA b/l, no rales, wheezes, or rhonchi  HEART: RRR, S1, S2  ABDOMEN: BS+, soft, nontender, nondistended  EXTREMITIES: No edema, cyanosis, or calf tenderness  NERVOUS SYSTEM: AA&Ox3    LABS:                        11.2   5.65  )-----------( 183      ( 31 Mar 2020 08:21 )             35.1     CBC Full  -  ( 31 Mar 2020 08:21 )  WBC Count : 5.65 K/uL  Hemoglobin : 11.2 g/dL  Hematocrit : 35.1 %  Platelet Count - Automated : 183 K/uL  Mean Cell Volume : 89.3 fl  Mean Cell Hemoglobin : 28.5 pg  Mean Cell Hemoglobin Concentration : 31.9 gm/dL  Auto Neutrophil # : 3.25 K/uL  Auto Lymphocyte # : 1.50 K/uL  Auto Monocyte # : 0.73 K/uL  Auto Eosinophil # : 0.07 K/uL  Auto Basophil # : 0.03 K/uL  Auto Neutrophil % : 57.7 %  Auto Lymphocyte % : 26.5 %  Auto Monocyte % : 12.9 %  Auto Eosinophil % : 1.2 %  Auto Basophil % : 0.5 %    31 Mar 2020 08:21    143    |  110    |  23     ----------------------------<  94     3.8     |  27     |  0.68     Ca    8.5        31 Mar 2020 08:21          CAPILLARY BLOOD GLUCOSE            Culture - Urine (collected 03-28-20 @ 00:40)  Source: .Urine Clean Catch (Midstream)  Final Report (03-28-20 @ 19:36):    <10,000 CFU/mL Normal Urogenital Nga    Culture - Blood (collected 03-27-20 @ 12:05)  Source: .Blood Blood-Peripheral  Final Report (03-31-20 @ 13:01):    No Growth Final    Culture - Blood (collected 03-27-20 @ 12:05)  Source: .Blood Blood-Peripheral  Final Report (03-31-20 @ 13:01):    No Growth Final        RADIOLOGY & ADDITIONAL TESTS:    Personally reviewed.     Consultant(s) Notes Reviewed:  [x] YES  [ ] NO *************CHARTING IN PROGRESS*****************  Patient is a 79y old  Female who presents with a chief complaint of fall (31 Mar 2020 11:34)      INTERVAL HPI/OVERNIGHT EVENTS: Patient seen and examined at bedside. Denies shortness of breath. Not oriented to time or place. Sating well at room air.     MEDICATIONS  (STANDING):  anastrozole 1 milliGRAM(s) Oral daily  heparin  Injectable 5000 Unit(s) SubCutaneous every 8 hours    MEDICATIONS  (PRN):  acetaminophen    Suspension .. 650 milliGRAM(s) Oral every 6 hours PRN Mild Pain (1 - 3)  acetaminophen    Suspension .. 650 milliGRAM(s) Oral every 6 hours PRN Temp greater or equal to 38C (100.4F)      Allergies    penicillin (Unknown)    Intolerances        REVIEW OF SYSTEMS:  CONSTITUTIONAL: No fever or chills  HEENT: No headache, no sore throat  RESPIRATORY: No cough, wheezing, or shortness of breath  CARDIOVASCULAR: No chest pain, palpitations, or leg swelling  GASTROINTESTINAL: No abd pain, nausea, vomiting, or diarrhea  GENITOURINARY: No dysuria, frequency, or hematuria  NEUROLOGICAL: No focal weakness or dizziness  MUSCULOSKELETAL: No myalgias     Vital Signs Last 24 Hrs  T(C): 37.1 (01 Apr 2020 04:53), Max: 37.4 (31 Mar 2020 20:19)  T(F): 98.8 (01 Apr 2020 04:53), Max: 99.3 (31 Mar 2020 20:19)  HR: 80 (01 Apr 2020 04:53) (73 - 87)  BP: 131/74 (01 Apr 2020 04:53) (112/69 - 150/81)  BP(mean): --  RR: 17 (01 Apr 2020 04:53) (17 - 18)  SpO2: 95% (01 Apr 2020 04:53) (93% - 95%)    PHYSICAL EXAM:  GENERAL: NAD  HEENT: EOMI, moist mucous membranes  CHEST/LUNG: CTA b/l, no rales, wheezes, or rhonchi  HEART: RRR, S1, S2  ABDOMEN: BS+, soft, nontender, nondistended  EXTREMITIES: No edema, cyanosis, or calf tenderness  NERVOUS SYSTEM: AA&Ox3    LABS:                        11.2   5.65  )-----------( 183      ( 31 Mar 2020 08:21 )             35.1     CBC Full  -  ( 31 Mar 2020 08:21 )  WBC Count : 5.65 K/uL  Hemoglobin : 11.2 g/dL  Hematocrit : 35.1 %  Platelet Count - Automated : 183 K/uL  Mean Cell Volume : 89.3 fl  Mean Cell Hemoglobin : 28.5 pg  Mean Cell Hemoglobin Concentration : 31.9 gm/dL  Auto Neutrophil # : 3.25 K/uL  Auto Lymphocyte # : 1.50 K/uL  Auto Monocyte # : 0.73 K/uL  Auto Eosinophil # : 0.07 K/uL  Auto Basophil # : 0.03 K/uL  Auto Neutrophil % : 57.7 %  Auto Lymphocyte % : 26.5 %  Auto Monocyte % : 12.9 %  Auto Eosinophil % : 1.2 %  Auto Basophil % : 0.5 %    31 Mar 2020 08:21    143    |  110    |  23     ----------------------------<  94     3.8     |  27     |  0.68     Ca    8.5        31 Mar 2020 08:21          CAPILLARY BLOOD GLUCOSE            Culture - Urine (collected 03-28-20 @ 00:40)  Source: .Urine Clean Catch (Midstream)  Final Report (03-28-20 @ 19:36):    <10,000 CFU/mL Normal Urogenital Nga    Culture - Blood (collected 03-27-20 @ 12:05)  Source: .Blood Blood-Peripheral  Final Report (03-31-20 @ 13:01):    No Growth Final    Culture - Blood (collected 03-27-20 @ 12:05)  Source: .Blood Blood-Peripheral  Final Report (03-31-20 @ 13:01):    No Growth Final        RADIOLOGY & ADDITIONAL TESTS:    Personally reviewed.     Consultant(s) Notes Reviewed:  [x] YES  [ ] NO *************CHARTING IN PROGRESS*****************  Patient is a 79y old  Female who presents with a chief complaint of fall (31 Mar 2020 11:34)      INTERVAL HPI/OVERNIGHT EVENTS: Patient seen and examined at bedside. Denies shortness of breath. Not oriented to time or place. Sating well at room air.     MEDICATIONS  (STANDING):  anastrozole 1 milliGRAM(s) Oral daily  heparin  Injectable 5000 Unit(s) SubCutaneous every 8 hours    MEDICATIONS  (PRN):  acetaminophen    Suspension .. 650 milliGRAM(s) Oral every 6 hours PRN Mild Pain (1 - 3)  acetaminophen    Suspension .. 650 milliGRAM(s) Oral every 6 hours PRN Temp greater or equal to 38C (100.4F)      Allergies    penicillin (Unknown)    Intolerances        REVIEW OF SYSTEMS:  CONSTITUTIONAL: No fever or chills  HEENT: No headache, no sore throat  RESPIRATORY: No cough, wheezing, or shortness of breath  CARDIOVASCULAR: No chest pain, palpitations, or leg swelling  GASTROINTESTINAL: No abd pain, nausea, vomiting, or diarrhea  GENITOURINARY: No dysuria, frequency, or hematuria  NEUROLOGICAL: No focal weakness or dizziness  MUSCULOSKELETAL: No myalgias     Vital Signs Last 24 Hrs  T(C): 37.1 (01 Apr 2020 04:53), Max: 37.4 (31 Mar 2020 20:19)  T(F): 98.8 (01 Apr 2020 04:53), Max: 99.3 (31 Mar 2020 20:19)  HR: 80 (01 Apr 2020 04:53) (73 - 87)  BP: 131/74 (01 Apr 2020 04:53) (112/69 - 150/81)  BP(mean): --  RR: 17 (01 Apr 2020 04:53) (17 - 18)  SpO2: 95% (01 Apr 2020 04:53) (93% - 95%)    PHYSICAL EXAM:  GENERAL: NAD  HEENT: EOMI, moist mucous membranes  CHEST/LUNG: CTA b/l, no rales, wheezes, or rhonchi  HEART: RRR, S1, S2  ABDOMEN: BS+, soft, nontender, nondistended  EXTREMITIES: + L leg edema (chronic) , cyanosis, or calf tenderness  NERVOUS SYSTEM: AA&Ox2    LABS:                        11.2   5.65  )-----------( 183      ( 31 Mar 2020 08:21 )             35.1     CBC Full  -  ( 31 Mar 2020 08:21 )  WBC Count : 5.65 K/uL  Hemoglobin : 11.2 g/dL  Hematocrit : 35.1 %  Platelet Count - Automated : 183 K/uL  Mean Cell Volume : 89.3 fl  Mean Cell Hemoglobin : 28.5 pg  Mean Cell Hemoglobin Concentration : 31.9 gm/dL  Auto Neutrophil # : 3.25 K/uL  Auto Lymphocyte # : 1.50 K/uL  Auto Monocyte # : 0.73 K/uL  Auto Eosinophil # : 0.07 K/uL  Auto Basophil # : 0.03 K/uL  Auto Neutrophil % : 57.7 %  Auto Lymphocyte % : 26.5 %  Auto Monocyte % : 12.9 %  Auto Eosinophil % : 1.2 %  Auto Basophil % : 0.5 %    31 Mar 2020 08:21    143    |  110    |  23     ----------------------------<  94     3.8     |  27     |  0.68     Ca    8.5        31 Mar 2020 08:21          CAPILLARY BLOOD GLUCOSE            Culture - Urine (collected 03-28-20 @ 00:40)  Source: .Urine Clean Catch (Midstream)  Final Report (03-28-20 @ 19:36):    <10,000 CFU/mL Normal Urogenital Nga    Culture - Blood (collected 03-27-20 @ 12:05)  Source: .Blood Blood-Peripheral  Final Report (03-31-20 @ 13:01):    No Growth Final    Culture - Blood (collected 03-27-20 @ 12:05)  Source: .Blood Blood-Peripheral  Final Report (03-31-20 @ 13:01):    No Growth Final        RADIOLOGY & ADDITIONAL TESTS:    Personally reviewed.     Consultant(s) Notes Reviewed:  [x] YES  [ ] NO Patient is a 79y old  Female who presents with a chief complaint of fall (31 Mar 2020 11:34)      INTERVAL HPI/OVERNIGHT EVENTS: Patient seen and examined at bedside. Denies shortness of breath. Not oriented to time or place. Sating well at room air.     MEDICATIONS  (STANDING):  anastrozole 1 milliGRAM(s) Oral daily  heparin  Injectable 5000 Unit(s) SubCutaneous every 8 hours    MEDICATIONS  (PRN):  acetaminophen    Suspension .. 650 milliGRAM(s) Oral every 6 hours PRN Mild Pain (1 - 3)  acetaminophen    Suspension .. 650 milliGRAM(s) Oral every 6 hours PRN Temp greater or equal to 38C (100.4F)      Allergies    penicillin (Unknown)    Intolerances        REVIEW OF SYSTEMS:  CONSTITUTIONAL: No fever or chills  HEENT: No headache, no sore throat  RESPIRATORY: No cough, wheezing, or shortness of breath  CARDIOVASCULAR: No chest pain, palpitations, or leg swelling  GASTROINTESTINAL: No abd pain, nausea, vomiting, or diarrhea  GENITOURINARY: No dysuria, frequency, or hematuria  NEUROLOGICAL: No focal weakness or dizziness  MUSCULOSKELETAL: No myalgias     Vital Signs Last 24 Hrs  T(C): 37.1 (01 Apr 2020 04:53), Max: 37.4 (31 Mar 2020 20:19)  T(F): 98.8 (01 Apr 2020 04:53), Max: 99.3 (31 Mar 2020 20:19)  HR: 80 (01 Apr 2020 04:53) (73 - 87)  BP: 131/74 (01 Apr 2020 04:53) (112/69 - 150/81)  BP(mean): --  RR: 17 (01 Apr 2020 04:53) (17 - 18)  SpO2: 95% (01 Apr 2020 04:53) (93% - 95%)    PHYSICAL EXAM:  GENERAL: NAD  HEENT: EOMI, moist mucous membranes  CHEST/LUNG: CTA b/l, no rales, wheezes, or rhonchi  HEART: RRR, S1, S2  ABDOMEN: BS+, soft, nontender, nondistended  EXTREMITIES: + L leg edema (chronic) , cyanosis, or calf tenderness  NERVOUS SYSTEM: AA&Ox2    LABS:                        11.2   5.65  )-----------( 183      ( 31 Mar 2020 08:21 )             35.1     CBC Full  -  ( 31 Mar 2020 08:21 )  WBC Count : 5.65 K/uL  Hemoglobin : 11.2 g/dL  Hematocrit : 35.1 %  Platelet Count - Automated : 183 K/uL  Mean Cell Volume : 89.3 fl  Mean Cell Hemoglobin : 28.5 pg  Mean Cell Hemoglobin Concentration : 31.9 gm/dL  Auto Neutrophil # : 3.25 K/uL  Auto Lymphocyte # : 1.50 K/uL  Auto Monocyte # : 0.73 K/uL  Auto Eosinophil # : 0.07 K/uL  Auto Basophil # : 0.03 K/uL  Auto Neutrophil % : 57.7 %  Auto Lymphocyte % : 26.5 %  Auto Monocyte % : 12.9 %  Auto Eosinophil % : 1.2 %  Auto Basophil % : 0.5 %    31 Mar 2020 08:21    143    |  110    |  23     ----------------------------<  94     3.8     |  27     |  0.68     Ca    8.5        31 Mar 2020 08:21          CAPILLARY BLOOD GLUCOSE            Culture - Urine (collected 03-28-20 @ 00:40)  Source: .Urine Clean Catch (Midstream)  Final Report (03-28-20 @ 19:36):    <10,000 CFU/mL Normal Urogenital Nga    Culture - Blood (collected 03-27-20 @ 12:05)  Source: .Blood Blood-Peripheral  Final Report (03-31-20 @ 13:01):    No Growth Final    Culture - Blood (collected 03-27-20 @ 12:05)  Source: .Blood Blood-Peripheral  Final Report (03-31-20 @ 13:01):    No Growth Final        RADIOLOGY & ADDITIONAL TESTS:    Personally reviewed.     Consultant(s) Notes Reviewed:  [x] YES  [ ] NO Patient is a 79y old  Female who presents with a chief complaint of fall (31 Mar 2020 11:34)      INTERVAL HPI/OVERNIGHT EVENTS: Patient seen and examined at bedside. Denies shortness of breath. Not oriented to time or place. Sating well at room air.     MEDICATIONS  (STANDING):  anastrozole 1 milliGRAM(s) Oral daily  heparin  Injectable 5000 Unit(s) SubCutaneous every 8 hours    MEDICATIONS  (PRN):  acetaminophen    Suspension .. 650 milliGRAM(s) Oral every 6 hours PRN Mild Pain (1 - 3)  acetaminophen    Suspension .. 650 milliGRAM(s) Oral every 6 hours PRN Temp greater or equal to 38C (100.4F)      Allergies    penicillin (Unknown)    Intolerances        Unable to obtain ROS 2/2 dementia    Vital Signs Last 24 Hrs  T(C): 37.1 (01 Apr 2020 04:53), Max: 37.4 (31 Mar 2020 20:19)  T(F): 98.8 (01 Apr 2020 04:53), Max: 99.3 (31 Mar 2020 20:19)  HR: 80 (01 Apr 2020 04:53) (73 - 87)  BP: 131/74 (01 Apr 2020 04:53) (112/69 - 150/81)  BP(mean): --  RR: 17 (01 Apr 2020 04:53) (17 - 18)  SpO2: 95% (01 Apr 2020 04:53) (93% - 95%)    PHYSICAL EXAM:  GENERAL: NAD  HEENT: EOMI, moist mucous membranes  CHEST/LUNG: CTA b/l, no rales, wheezes, or rhonchi  HEART: RRR, S1, S2  ABDOMEN: BS+, soft, nontender, nondistended  EXTREMITIES: + R leg edema (chronic) , no cyanosis, or calf tenderness  NERVOUS SYSTEM: AA&Ox2    LABS:                        11.2   5.65  )-----------( 183      ( 31 Mar 2020 08:21 )             35.1     CBC Full  -  ( 31 Mar 2020 08:21 )  WBC Count : 5.65 K/uL  Hemoglobin : 11.2 g/dL  Hematocrit : 35.1 %  Platelet Count - Automated : 183 K/uL  Mean Cell Volume : 89.3 fl  Mean Cell Hemoglobin : 28.5 pg  Mean Cell Hemoglobin Concentration : 31.9 gm/dL  Auto Neutrophil # : 3.25 K/uL  Auto Lymphocyte # : 1.50 K/uL  Auto Monocyte # : 0.73 K/uL  Auto Eosinophil # : 0.07 K/uL  Auto Basophil # : 0.03 K/uL  Auto Neutrophil % : 57.7 %  Auto Lymphocyte % : 26.5 %  Auto Monocyte % : 12.9 %  Auto Eosinophil % : 1.2 %  Auto Basophil % : 0.5 %    31 Mar 2020 08:21    143    |  110    |  23     ----------------------------<  94     3.8     |  27     |  0.68     Ca    8.5        31 Mar 2020 08:21          CAPILLARY BLOOD GLUCOSE            Culture - Urine (collected 03-28-20 @ 00:40)  Source: .Urine Clean Catch (Midstream)  Final Report (03-28-20 @ 19:36):    <10,000 CFU/mL Normal Urogenital Nga    Culture - Blood (collected 03-27-20 @ 12:05)  Source: .Blood Blood-Peripheral  Final Report (03-31-20 @ 13:01):    No Growth Final    Culture - Blood (collected 03-27-20 @ 12:05)  Source: .Blood Blood-Peripheral  Final Report (03-31-20 @ 13:01):    No Growth Final        RADIOLOGY & ADDITIONAL TESTS:    Personally reviewed.     Consultant(s) Notes Reviewed:  [x] YES  [ ] NO

## 2020-04-02 LAB
ALBUMIN SERPL ELPH-MCNC: 2.5 G/DL — LOW (ref 3.3–5)
ALP SERPL-CCNC: 65 U/L — SIGNIFICANT CHANGE UP (ref 40–120)
ALT FLD-CCNC: 109 U/L — HIGH (ref 12–78)
ANION GAP SERPL CALC-SCNC: 7 MMOL/L — SIGNIFICANT CHANGE UP (ref 5–17)
AST SERPL-CCNC: 147 U/L — HIGH (ref 15–37)
BASOPHILS # BLD AUTO: 0.08 K/UL — SIGNIFICANT CHANGE UP (ref 0–0.2)
BASOPHILS NFR BLD AUTO: 0.8 % — SIGNIFICANT CHANGE UP (ref 0–2)
BILIRUB SERPL-MCNC: 0.7 MG/DL — SIGNIFICANT CHANGE UP (ref 0.2–1.2)
BUN SERPL-MCNC: 15 MG/DL — SIGNIFICANT CHANGE UP (ref 7–23)
CALCIUM SERPL-MCNC: 8.7 MG/DL — SIGNIFICANT CHANGE UP (ref 8.5–10.1)
CHLORIDE SERPL-SCNC: 110 MMOL/L — HIGH (ref 96–108)
CO2 SERPL-SCNC: 27 MMOL/L — SIGNIFICANT CHANGE UP (ref 22–31)
CREAT SERPL-MCNC: 0.59 MG/DL — SIGNIFICANT CHANGE UP (ref 0.5–1.3)
CRP SERPL-MCNC: 2.8 MG/DL — HIGH (ref 0–0.4)
EOSINOPHIL # BLD AUTO: 0.22 K/UL — SIGNIFICANT CHANGE UP (ref 0–0.5)
EOSINOPHIL NFR BLD AUTO: 2.3 % — SIGNIFICANT CHANGE UP (ref 0–6)
FERRITIN SERPL-MCNC: 1125 NG/ML — HIGH (ref 15–150)
GLUCOSE SERPL-MCNC: 92 MG/DL — SIGNIFICANT CHANGE UP (ref 70–99)
HCT VFR BLD CALC: 34 % — LOW (ref 34.5–45)
HGB BLD-MCNC: 10.8 G/DL — LOW (ref 11.5–15.5)
IMM GRANULOCYTES NFR BLD AUTO: 2 % — HIGH (ref 0–1.5)
LYMPHOCYTES # BLD AUTO: 1.61 K/UL — SIGNIFICANT CHANGE UP (ref 1–3.3)
LYMPHOCYTES # BLD AUTO: 16.9 % — SIGNIFICANT CHANGE UP (ref 13–44)
MCHC RBC-ENTMCNC: 28.4 PG — SIGNIFICANT CHANGE UP (ref 27–34)
MCHC RBC-ENTMCNC: 31.8 GM/DL — LOW (ref 32–36)
MCV RBC AUTO: 89.5 FL — SIGNIFICANT CHANGE UP (ref 80–100)
MONOCYTES # BLD AUTO: 0.91 K/UL — HIGH (ref 0–0.9)
MONOCYTES NFR BLD AUTO: 9.5 % — SIGNIFICANT CHANGE UP (ref 2–14)
NEUTROPHILS # BLD AUTO: 6.53 K/UL — SIGNIFICANT CHANGE UP (ref 1.8–7.4)
NEUTROPHILS NFR BLD AUTO: 68.5 % — SIGNIFICANT CHANGE UP (ref 43–77)
NRBC # BLD: 0 /100 WBCS — SIGNIFICANT CHANGE UP (ref 0–0)
PLATELET # BLD AUTO: 234 K/UL — SIGNIFICANT CHANGE UP (ref 150–400)
POTASSIUM SERPL-MCNC: 3.5 MMOL/L — SIGNIFICANT CHANGE UP (ref 3.5–5.3)
POTASSIUM SERPL-SCNC: 3.5 MMOL/L — SIGNIFICANT CHANGE UP (ref 3.5–5.3)
PROT SERPL-MCNC: 5.9 G/DL — LOW (ref 6–8.3)
RBC # BLD: 3.8 M/UL — SIGNIFICANT CHANGE UP (ref 3.8–5.2)
RBC # FLD: 15.2 % — HIGH (ref 10.3–14.5)
SODIUM SERPL-SCNC: 144 MMOL/L — SIGNIFICANT CHANGE UP (ref 135–145)
WBC # BLD: 9.54 K/UL — SIGNIFICANT CHANGE UP (ref 3.8–10.5)
WBC # FLD AUTO: 9.54 K/UL — SIGNIFICANT CHANGE UP (ref 3.8–10.5)

## 2020-04-02 PROCEDURE — 99232 SBSQ HOSP IP/OBS MODERATE 35: CPT

## 2020-04-02 RX ADMIN — ANASTROZOLE 1 MILLIGRAM(S): 1 TABLET ORAL at 13:35

## 2020-04-02 RX ADMIN — HEPARIN SODIUM 5000 UNIT(S): 5000 INJECTION INTRAVENOUS; SUBCUTANEOUS at 05:33

## 2020-04-02 RX ADMIN — HEPARIN SODIUM 5000 UNIT(S): 5000 INJECTION INTRAVENOUS; SUBCUTANEOUS at 21:14

## 2020-04-02 RX ADMIN — HEPARIN SODIUM 5000 UNIT(S): 5000 INJECTION INTRAVENOUS; SUBCUTANEOUS at 13:35

## 2020-04-02 NOTE — PROGRESS NOTE ADULT - PROBLEM SELECTOR PLAN 2
CT RLE with no displaced fracture.   CT head and cervical spine: No acute fractures or dislocations.    Multilevel cervical spondylosis.  No acute intracranial hemorrhage, mass effect, or shift of the midline structures.   RLE doppler negative for DVT.    X-rays with no acute fracture or bony pathology.    PT recommending JOSE- spoke with SW- will reach out to family for choices Safety Precautions. Evaluated by PT    PT recommending JOSE- spoke with SW- will reach out to family for choices Safety Precautions. Evaluated by PT    PT recommending JOSE- spoke with SW- will reach out to family for choices. Awaiting bed placement

## 2020-04-02 NOTE — PROGRESS NOTE ADULT - PROBLEM SELECTOR PLAN 1
Acute respiratory failure 2/2 PNA in setting of suspected COVID19 viral illness given clinical presentation.   - COVID +, CT C/A/P with no acute findings.   -Blood cx NGTD and urine cx negative.   - Tylenol PRN.   - ID f/u  - NLR 4.8, low risk for decompensation   - Uncertain about # of days w/ symptoms    - continue O2 NC, may use NRB. Currently sat well on RA   -  AVOID HFNC/Bipap/Nebs  - Contact and droplet isolation precautions  - Check CRP, ferritin (correlates with severity/mortality)  - Tylenol prn for fever  - may use albuterol/ipratroprium inhaler  - Start supplemental oxygen therapy immediately if pt has respiratory distress target SpO2 > 94%  - monitor respiratory status closely  - Code Status: Full code Continue to monitor resp status and 02 sat.

## 2020-04-02 NOTE — PROGRESS NOTE ADULT - ASSESSMENT
Patient is a 79y old  Female who presents with a chief complaint of fall Patient is a 79y old  Female who presents with a chief complaint of fall. COVID +

## 2020-04-02 NOTE — PROGRESS NOTE ADULT - SUBJECTIVE AND OBJECTIVE BOX
Patient is a 79y old  Female who presents with a chief complaint of fall (01 Apr 2020 07:56)      FROM ADMISSION H+P:   HPI:  Pt is a 79F with PMHx Alzheimer's dementia, breast cancer, who presents to ED with frequent falls, inability to ambulate. Hx obtained from pt's daughter Isidra, who is her caretaker. Pt states pt had two falls yesterday, 1 witnessed w/ head strike, no LOC, 1 unwitnessed. States pt was fine yesterday, ambulating well. Demented at baseline, but usually steady on her feet. Denies signs of fevers, chills, n/v/d in her mother at home. Daughter states that her son-in-law tested COVID (+) and was self quarantining in different floor of the house, and had no contact with her mother. States mother also had episode of incontinence which was unusual for her, but states this happened while pt was on the floor. GOC d/w pt's daughter who states that her and her family are in agreement for pt DNR/DNI status, although pt does not have official HCP or MOLST filled.    In the ED: CTH, CT A/P, CT c-spine, all WNL. CT Chest showing RML nodule. Xray full body series negative for acute f/x. NLR 9. RLE US neg for DVT. Pt was given 1x dose azithro, 1L NS bolus in ED. (26 Mar 2020 22:34)      ----  INTERVAL HPI/OVERNIGHT EVENTS: Pt seen and evaluated at the bedside. No acute overnight events occurred.     ----  PAST MEDICAL & SURGICAL HISTORY:  Alzheimer's dementia  Breast cancer      FAMILY HISTORY:      Allergies    penicillin (Unknown)    Intolerances        ----  REVIEW OF SYSTEMS:  CONSTITUTIONAL: denies fever, chills, fatigue, weakness  HEENT: denies blurred vision, sore throat  SKIN: denies new lesions, rash  CARDIOVASCULAR: denies chest pain, chest pressure, palpitations  RESPIRATORY: denies shortness of breath, sputum production  GASTROINTESTINAL: denies nausea, vomiting, diarrhea, abdominal pain  GENITOURINARY: denies dysuria, discharge  NEUROLOGICAL: denies numbness, headache, focal weakness  MUSCULOSKELETAL: denies new joint pain, muscle aches  HEMATOLOGIC: denies gross bleeding, bruising  LYMPHATICS: denies enlarged lymph nodes, extremity swelling  PSYCHIATRIC: denies recent changes in anxiety, depression  ENDOCRINOLOGIC: denies sweating, cold or heat intolerance    ----  PHYSICAL EXAM:  GENERAL: patient appears well, no acute distress, appropriately interactive  EYES: sclera clear, no exudates  ENMT: oropharynx clear without erythema, moist mucous membranes  NECK: supple, soft, no thyromegaly noted  LUNGS: good air entry bilaterally, clear to auscultation, no wheezing or rhonchi appreciated  HEART: S1/S2, regular rate and rhythm, no murmurs noted, no noted edema to b/l LE  GASTROINTESTINAL: abdomen is soft, nontender, nondistended, normoactive bowel sounds, no palpable masses  INTEGUMENT: good skin turgor, appropriate for ethnicity, appears well perfused, no jaundice noted  MUSCULOSKELETAL: no clubbing or cyanosis, no obvious deformity  NEUROLOGIC: awake, alert, oriented x3, strength 5/5 UE and LE , no sensory deficits  PSYCHIATRIC: mood is good, affect is congruent with mood, linear and logical thought process  HEME/LYMPH:  no obvious ecchymosis , lympadenopathy    T(C): 37.6 (04-02-20 @ 04:45), Max: 37.6 (04-02-20 @ 04:45)  HR: 85 (04-02-20 @ 04:45) (85 - 93)  BP: 154/80 (04-02-20 @ 04:45) (150/77 - 157/71)  RR: 17 (04-02-20 @ 04:45) (17 - 18)  SpO2: 92% (04-02-20 @ 04:45) (92% - 94%)  Wt(kg): --    ----  I&O's Summary    01 Apr 2020 07:01  -  02 Apr 2020 07:00  --------------------------------------------------------  IN: 0 mL / OUT: 600 mL / NET: -600 mL        LABS:                        11.3   8.49  )-----------( 219      ( 01 Apr 2020 10:17 )             35.0     04-01    145  |  110<H>  |  20  ----------------------------<  104<H>  3.5   |  28  |  0.79    Ca    9.0      01 Apr 2020 10:17  Mg     2.0     04-01          CAPILLARY BLOOD GLUCOSE                    ----  Personally reviewed:  Vital sign trends: [  ] yes    [  ] no     [  ] n/a  Laboratory results: [  ] yes    [  ] no     [  ] n/a  Radiology results: [  ] yes    [  ] no     [  ] n/a  Culture results: [  ] yes    [  ] no     [  ] n/a  Consultant recommendations: [  ] yes    [  ] no     [  ] n/a Patient is a 79y old  Female who presents with a chief complaint of fall (01 Apr 2020 07:56)        INTERVAL HPI/OVERNIGHT EVENTS: Patient seen and examined at bedside. Awake, alert but pleasantly confused. Not oriented to time or place. Denies SOB. Sating well on RA.       PAST MEDICAL & SURGICAL HISTORY:  Alzheimer's dementia  Breast cancer      FAMILY HISTORY:      Allergies    penicillin (Unknown)    Intolerances        ----  REVIEW OF SYSTEMS:  CONSTITUTIONAL: denies fever, chills, fatigue, weakness  HEENT: denies blurred vision, sore throat  SKIN: denies new lesions, rash  CARDIOVASCULAR: denies chest pain, chest pressure, palpitations  RESPIRATORY: denies shortness of breath, sputum production  GASTROINTESTINAL: denies nausea, vomiting, diarrhea, abdominal pain  GENITOURINARY: denies dysuria, discharge  NEUROLOGICAL: denies numbness, headache, focal weakness  MUSCULOSKELETAL: denies new joint pain, muscle aches  HEMATOLOGIC: denies gross bleeding, bruising  LYMPHATICS: denies enlarged lymph nodes, extremity swelling  PSYCHIATRIC: denies recent changes in anxiety, depression  ENDOCRINOLOGIC: denies sweating, cold or heat intolerance    ----  PHYSICAL EXAM:  GENERAL: patient appears well, no acute distress, appropriately interactive  EYES: sclera clear, no exudates  ENMT: oropharynx clear without erythema, moist mucous membranes  NECK: supple, soft, no thyromegaly noted  LUNGS: good air entry bilaterally, clear to auscultation, no wheezing or rhonchi appreciated  HEART: S1/S2, regular rate and rhythm, no murmurs noted, no noted edema to b/l LE  GASTROINTESTINAL: abdomen is soft, nontender, nondistended, normoactive bowel sounds, no palpable masses  INTEGUMENT: good skin turgor, appropriate for ethnicity, appears well perfused, no jaundice noted  MUSCULOSKELETAL: no clubbing or cyanosis, no obvious deformity  NEUROLOGIC: awake, alert, confused, strength 4/5 UE and LE , no sensory deficits  PSYCHIATRIC: mood is good, affect is congruent with mood, linear and logical thought process  HEME/LYMPH:  no obvious ecchymosis , lymphadenopathy    T(C): 37.6 (04-02-20 @ 04:45), Max: 37.6 (04-02-20 @ 04:45)  HR: 85 (04-02-20 @ 04:45) (85 - 93)  BP: 154/80 (04-02-20 @ 04:45) (150/77 - 157/71)  RR: 17 (04-02-20 @ 04:45) (17 - 18)  SpO2: 92% (04-02-20 @ 04:45) (92% - 94%)  Wt(kg): --    ----  I&O's Summary    01 Apr 2020 07:01  -  02 Apr 2020 07:00  --------------------------------------------------------  IN: 0 mL / OUT: 600 mL / NET: -600 mL        LABS:                        11.3   8.49  )-----------( 219      ( 01 Apr 2020 10:17 )             35.0     04-01    145  |  110<H>  |  20  ----------------------------<  104<H>  3.5   |  28  |  0.79    Ca    9.0      01 Apr 2020 10:17  Mg     2.0     04-01          CAPILLARY BLOOD GLUCOSE                    ----  Personally reviewed:  Vital sign trends: [x  ] yes    [  ] no     [  ] n/a  Laboratory results: [  x] yes    [  ] no     [  ] n/a  Radiology results: [x  ] yes    [  ] no     [  ] n/a  Culture results: [  ] yes    [  ] no     [  ] n/a  Consultant recommendations: [  ] yes    [  ] no     [  ] n/a Patient is a 79y old  Female who presents with a chief complaint of fall (01 Apr 2020 07:56)        INTERVAL HPI/OVERNIGHT EVENTS: Patient seen and examined at bedside. Awake, alert but pleasantly confused. Not oriented to time or place. Denies SOB. Sating well on RA.       PAST MEDICAL & SURGICAL HISTORY:  Alzheimer's dementia  Breast cancer      FAMILY HISTORY:      Allergies    penicillin (Unknown)    Intolerances        ----  REVIEW OF SYSTEMS: Denies SOB, CP, Abdominal pain   ----  PHYSICAL EXAM:  GENERAL: NAD  HEENT: EOMI, hearing normal, conjunctiva and sclera clear  Chest: CTA bilaterally, no wheezing  CV: S1S2, RRR,   GI: soft, +BS, NT/ND  Musculoskeletal: trace right LE edema, yellowish in color,   Psychiatric: +dementia  Skin: warm and dry, RLL ecchymotic areas noted     T(C): 37.6 (04-02-20 @ 04:45), Max: 37.6 (04-02-20 @ 04:45)  HR: 85 (04-02-20 @ 04:45) (85 - 93)  BP: 154/80 (04-02-20 @ 04:45) (150/77 - 157/71)  RR: 17 (04-02-20 @ 04:45) (17 - 18)  SpO2: 92% (04-02-20 @ 04:45) (92% - 94%)  Wt(kg): --    ----  I&O's Summary    01 Apr 2020 07:01  -  02 Apr 2020 07:00  --------------------------------------------------------  IN: 0 mL / OUT: 600 mL / NET: -600 mL        LABS:                        11.3   8.49  )-----------( 219      ( 01 Apr 2020 10:17 )             35.0     04-01    145  |  110<H>  |  20  ----------------------------<  104<H>  3.5   |  28  |  0.79    Ca    9.0      01 Apr 2020 10:17  Mg     2.0     04-01          CAPILLARY BLOOD GLUCOSE                    ----  Personally reviewed:  Vital sign trends: [x  ] yes    [  ] no     [  ] n/a  Laboratory results: [  x] yes    [  ] no     [  ] n/a  Radiology results: [x  ] yes    [  ] no     [  ] n/a  Culture results: [  ] yes    [  ] no     [  ] n/a  Consultant recommendations: [  ] yes    [  ] no     [  ] n/a

## 2020-04-03 VITALS — HEART RATE: 78 BPM | OXYGEN SATURATION: 95 %

## 2020-04-03 LAB
ALBUMIN SERPL ELPH-MCNC: 2.6 G/DL — LOW (ref 3.3–5)
ALP SERPL-CCNC: 68 U/L — SIGNIFICANT CHANGE UP (ref 40–120)
ALT FLD-CCNC: 119 U/L — HIGH (ref 12–78)
ANION GAP SERPL CALC-SCNC: 8 MMOL/L — SIGNIFICANT CHANGE UP (ref 5–17)
AST SERPL-CCNC: 136 U/L — HIGH (ref 15–37)
BASOPHILS # BLD AUTO: 0.09 K/UL — SIGNIFICANT CHANGE UP (ref 0–0.2)
BASOPHILS NFR BLD AUTO: 1 % — SIGNIFICANT CHANGE UP (ref 0–2)
BILIRUB SERPL-MCNC: 0.6 MG/DL — SIGNIFICANT CHANGE UP (ref 0.2–1.2)
BUN SERPL-MCNC: 16 MG/DL — SIGNIFICANT CHANGE UP (ref 7–23)
CALCIUM SERPL-MCNC: 8.8 MG/DL — SIGNIFICANT CHANGE UP (ref 8.5–10.1)
CHLORIDE SERPL-SCNC: 109 MMOL/L — HIGH (ref 96–108)
CK SERPL-CCNC: 442 U/L — HIGH (ref 26–192)
CO2 SERPL-SCNC: 27 MMOL/L — SIGNIFICANT CHANGE UP (ref 22–31)
CREAT SERPL-MCNC: 0.61 MG/DL — SIGNIFICANT CHANGE UP (ref 0.5–1.3)
CRP SERPL-MCNC: 2.76 MG/DL — HIGH (ref 0–0.4)
EOSINOPHIL # BLD AUTO: 0.18 K/UL — SIGNIFICANT CHANGE UP (ref 0–0.5)
EOSINOPHIL NFR BLD AUTO: 2 % — SIGNIFICANT CHANGE UP (ref 0–6)
GLUCOSE SERPL-MCNC: 69 MG/DL — LOW (ref 70–99)
HCT VFR BLD CALC: 36.7 % — SIGNIFICANT CHANGE UP (ref 34.5–45)
HGB BLD-MCNC: 11.4 G/DL — LOW (ref 11.5–15.5)
LYMPHOCYTES # BLD AUTO: 1.19 K/UL — SIGNIFICANT CHANGE UP (ref 1–3.3)
LYMPHOCYTES # BLD AUTO: 13 % — SIGNIFICANT CHANGE UP (ref 13–44)
MCHC RBC-ENTMCNC: 28.4 PG — SIGNIFICANT CHANGE UP (ref 27–34)
MCHC RBC-ENTMCNC: 31.1 GM/DL — LOW (ref 32–36)
MCV RBC AUTO: 91.5 FL — SIGNIFICANT CHANGE UP (ref 80–100)
MONOCYTES # BLD AUTO: 0.91 K/UL — HIGH (ref 0–0.9)
MONOCYTES NFR BLD AUTO: 10 % — SIGNIFICANT CHANGE UP (ref 2–14)
NEUTROPHILS # BLD AUTO: 6.4 K/UL — SIGNIFICANT CHANGE UP (ref 1.8–7.4)
NEUTROPHILS NFR BLD AUTO: 69 % — SIGNIFICANT CHANGE UP (ref 43–77)
NRBC # BLD: SIGNIFICANT CHANGE UP /100 WBCS (ref 0–0)
PLATELET # BLD AUTO: 264 K/UL — SIGNIFICANT CHANGE UP (ref 150–400)
POTASSIUM SERPL-MCNC: 3.8 MMOL/L — SIGNIFICANT CHANGE UP (ref 3.5–5.3)
POTASSIUM SERPL-SCNC: 3.8 MMOL/L — SIGNIFICANT CHANGE UP (ref 3.5–5.3)
PROCALCITONIN SERPL-MCNC: 0.12 NG/ML — HIGH (ref 0–0.04)
PROT SERPL-MCNC: 6.2 G/DL — SIGNIFICANT CHANGE UP (ref 6–8.3)
RBC # BLD: 4.01 M/UL — SIGNIFICANT CHANGE UP (ref 3.8–5.2)
RBC # FLD: 15.3 % — HIGH (ref 10.3–14.5)
SODIUM SERPL-SCNC: 144 MMOL/L — SIGNIFICANT CHANGE UP (ref 135–145)
WBC # BLD: 9.14 K/UL — SIGNIFICANT CHANGE UP (ref 3.8–10.5)
WBC # FLD AUTO: 9.14 K/UL — SIGNIFICANT CHANGE UP (ref 3.8–10.5)

## 2020-04-03 PROCEDURE — 82550 ASSAY OF CK (CPK): CPT

## 2020-04-03 PROCEDURE — 73630 X-RAY EXAM OF FOOT: CPT

## 2020-04-03 PROCEDURE — 86140 C-REACTIVE PROTEIN: CPT

## 2020-04-03 PROCEDURE — 97162 PT EVAL MOD COMPLEX 30 MIN: CPT

## 2020-04-03 PROCEDURE — 97530 THERAPEUTIC ACTIVITIES: CPT

## 2020-04-03 PROCEDURE — 83735 ASSAY OF MAGNESIUM: CPT

## 2020-04-03 PROCEDURE — 80053 COMPREHEN METABOLIC PANEL: CPT

## 2020-04-03 PROCEDURE — 71045 X-RAY EXAM CHEST 1 VIEW: CPT

## 2020-04-03 PROCEDURE — 84145 PROCALCITONIN (PCT): CPT

## 2020-04-03 PROCEDURE — 87040 BLOOD CULTURE FOR BACTERIA: CPT

## 2020-04-03 PROCEDURE — 73502 X-RAY EXAM HIP UNI 2-3 VIEWS: CPT

## 2020-04-03 PROCEDURE — 74176 CT ABD & PELVIS W/O CONTRAST: CPT

## 2020-04-03 PROCEDURE — 70450 CT HEAD/BRAIN W/O DYE: CPT

## 2020-04-03 PROCEDURE — 81001 URINALYSIS AUTO W/SCOPE: CPT

## 2020-04-03 PROCEDURE — 93005 ELECTROCARDIOGRAM TRACING: CPT

## 2020-04-03 PROCEDURE — 71250 CT THORAX DX C-: CPT

## 2020-04-03 PROCEDURE — 73080 X-RAY EXAM OF ELBOW: CPT

## 2020-04-03 PROCEDURE — 80048 BASIC METABOLIC PNL TOTAL CA: CPT

## 2020-04-03 PROCEDURE — 97110 THERAPEUTIC EXERCISES: CPT

## 2020-04-03 PROCEDURE — 73552 X-RAY EXAM OF FEMUR 2/>: CPT

## 2020-04-03 PROCEDURE — 99285 EMERGENCY DEPT VISIT HI MDM: CPT

## 2020-04-03 PROCEDURE — 82728 ASSAY OF FERRITIN: CPT

## 2020-04-03 PROCEDURE — 96374 THER/PROPH/DIAG INJ IV PUSH: CPT

## 2020-04-03 PROCEDURE — 93971 EXTREMITY STUDY: CPT

## 2020-04-03 PROCEDURE — 99239 HOSP IP/OBS DSCHRG MGMT >30: CPT

## 2020-04-03 PROCEDURE — 85610 PROTHROMBIN TIME: CPT

## 2020-04-03 PROCEDURE — 85730 THROMBOPLASTIN TIME PARTIAL: CPT

## 2020-04-03 PROCEDURE — 87635 SARS-COV-2 COVID-19 AMP PRB: CPT

## 2020-04-03 PROCEDURE — 97116 GAIT TRAINING THERAPY: CPT

## 2020-04-03 PROCEDURE — 87086 URINE CULTURE/COLONY COUNT: CPT

## 2020-04-03 PROCEDURE — 85652 RBC SED RATE AUTOMATED: CPT

## 2020-04-03 PROCEDURE — 85027 COMPLETE CBC AUTOMATED: CPT

## 2020-04-03 PROCEDURE — 72125 CT NECK SPINE W/O DYE: CPT

## 2020-04-03 PROCEDURE — 73700 CT LOWER EXTREMITY W/O DYE: CPT

## 2020-04-03 PROCEDURE — 36415 COLL VENOUS BLD VENIPUNCTURE: CPT

## 2020-04-03 RX ORDER — ACETAMINOPHEN 500 MG
20.31 TABLET ORAL
Qty: 0 | Refills: 0 | DISCHARGE
Start: 2020-04-03

## 2020-04-03 RX ORDER — ANASTROZOLE 1 MG/1
1 TABLET ORAL
Qty: 0 | Refills: 0 | DISCHARGE
Start: 2020-04-03

## 2020-04-03 RX ORDER — ACETAMINOPHEN 500 MG
2 TABLET ORAL
Qty: 0 | Refills: 0 | DISCHARGE

## 2020-04-03 RX ORDER — HEPARIN SODIUM 5000 [USP'U]/ML
5000 INJECTION INTRAVENOUS; SUBCUTANEOUS
Qty: 0 | Refills: 0 | DISCHARGE
Start: 2020-04-03

## 2020-04-03 RX ORDER — ANASTROZOLE 1 MG/1
1 TABLET ORAL
Qty: 0 | Refills: 0 | DISCHARGE

## 2020-04-03 RX ADMIN — HEPARIN SODIUM 5000 UNIT(S): 5000 INJECTION INTRAVENOUS; SUBCUTANEOUS at 14:27

## 2020-04-03 RX ADMIN — ANASTROZOLE 1 MILLIGRAM(S): 1 TABLET ORAL at 14:27

## 2020-04-03 RX ADMIN — HEPARIN SODIUM 5000 UNIT(S): 5000 INJECTION INTRAVENOUS; SUBCUTANEOUS at 04:09

## 2020-04-03 NOTE — PROGRESS NOTE ADULT - PROBLEM SELECTOR PLAN 3
RML nodule recommending 6 month follow up imaging as outpatient. - RML nodule noted on CT chest  - Recommending 6 month follow up imaging as outpatient

## 2020-04-03 NOTE — PROGRESS NOTE ADULT - PROBLEM SELECTOR PROBLEM 1
Infection due to 2019 novel coronavirus

## 2020-04-03 NOTE — PROGRESS NOTE ADULT - PROBLEM SELECTOR PROBLEM 3
Nodule of right lung

## 2020-04-03 NOTE — PROGRESS NOTE ADULT - PROBLEM SELECTOR PLAN 6
Early ambulation IMPROVE VTE Individual Risk Assessment          RISK                                                          Points  [  ] Previous VTE                                                3  [  ] Thrombophilia                                             2  [  ] Lower limb paralysis                                   2        (unable to hold up >15 seconds)    [  ] Current Cancer                                             2         (within 6 months)  [  ] Immobilization > 24 hrs                              1  [  ] ICU/CCU stay > 24 hours                             1  [ x ] Age > 60                                                         1    IMPROVE VTE Score: 1    - DVT ppx with Heparin subq

## 2020-04-03 NOTE — PROGRESS NOTE ADULT - ASSESSMENT
CHARTING IN PROGRESS    Patient is a 79y old  Female who presents with a chief complaint of fall. COVID + Patient is a 79y old  Female who presents with a chief complaint of fall, found to be COVID +.

## 2020-04-03 NOTE — PROGRESS NOTE ADULT - PROBLEM SELECTOR PLAN 2
Safety Precautions. Evaluated by PT    PT recommending [facility]- spoke with SW- will reach out to family for choices. Awaiting bed placement - Safety Precautions  - Evaluated by PT, recommending [facility], now awaiting bed placement  - D/c planning today

## 2020-04-03 NOTE — DISCHARGE NOTE NURSING/CASE MANAGEMENT/SOCIAL WORK - NSDCPEEMAIL_GEN_ALL_CORE
[facility] email [email or web address]

## 2020-04-03 NOTE — PROGRESS NOTE ADULT - PROVIDER SPECIALTY LIST ADULT
Hospitalist
Infectious Disease
Pt presents with concerns of a sore throat and cough.  Pt states that her throat was a little sore this morning, but it got worse around 2100 after she took a cough drop.  Pt developed a harsh cough about an hour ago. Pt states that it is hard to breath when laying flat.   
Infectious Disease

## 2020-04-03 NOTE — DISCHARGE NOTE NURSING/CASE MANAGEMENT/SOCIAL WORK - PATIENT PORTAL LINK FT
You can access the FollowMyHealth Patient Portal offered by [facility] by registering at the following website: [email or web address]. By joining [employer]’s FollowMyHealth portal, you will also be able to view your health information using other applications (apps) compatible with our system.

## 2020-04-03 NOTE — DISCHARGE NOTE NURSING/CASE MANAGEMENT/SOCIAL WORK - NSDCPEWEB_GEN_ALL_CORE
[place] website --- [email or web address] [email or web address] Tobacco [email or web address] website --- [email or web address]

## 2020-04-03 NOTE — CHART NOTE - NSCHARTNOTEFT_GEN_A_CORE
Assessment: Per RN, d/c today. Eating well.    Factors impacting intake: [ ] none [ ] nausea  [ ] vomiting [ ] diarrhea [ ] constipation  [ ]chewing problems [ ] swallowing issues  [ ] other:     Diet Presciption: Diet, Dysphagia 2 Mechanical Soft-Thin Liquids:   Supplement Feeding Modality:  Oral  Ensure Enlive Servings Per Day:  1       Frequency:  Daily (03-28-20 @ 11:58)    Intake: 75%    Current Weight: Weight (kg): 65.3 (03-27 @ 20:51)  % Weight Change    Pertinent Medications: MEDICATIONS  (STANDING):  anastrozole 1 milliGRAM(s) Oral daily  heparin  Injectable 5000 Unit(s) SubCutaneous every 8 hours    MEDICATIONS  (PRN):  acetaminophen    Suspension .. 650 milliGRAM(s) Oral every 6 hours PRN Mild Pain (1 - 3)  acetaminophen    Suspension .. 650 milliGRAM(s) Oral every 6 hours PRN Temp greater or equal to 38C (100.4F)    Pertinent Labs: 04-03 Na144 mmol/L Glu 69 mg/dL<L> K+ 3.8 mmol/L Cr  0.61 mg/dL BUN 16 mg/dL 04-03 Alb 2.6 g/dL<L>     CAPILLARY BLOOD GLUCOSE        Skin: sacral st 1 pressure injury    Estimated Needs:   [ ] no change since previous assessment  [ ] recalculated:     Previous Nutrition Diagnosis:   [ ] Inadequate Energy Intake [ ]Inadequate Oral Intake [ ] Excessive Energy Intake   [ ] Underweight [x ] Increased Nutrient Needs [ ] Overweight/Obesity   [ ] Altered GI Function [ ] Unintended Weight Loss [ ] Food & Nutrition Related Knowledge Deficit [ ] Malnutrition     Nutrition Diagnosis is [x ] ongoing  [ ] resolved [ ] not applicable     New Nutrition Diagnosis: [ ] not applicable     Consider mvi daily  Interventions:   Recommend  [ ] Change Diet To:  [ ] Nutrition Supplement  [ ] Nutrition Support  [ ] Other:     Monitoring and Evaluation:   [ ] PO intake [ x ] Tolerance to diet prescription [ x ] weights [ x ] labs[ x ] follow up per protocol  [ ] other:

## 2020-04-03 NOTE — PROGRESS NOTE ADULT - PROBLEM SELECTOR PLAN 1
Continue to monitor resp status and 02 sat. - CXR with L lung atelectasis, CT chesst with RML groundglass nodule  - Tested COVID (+) on 3/27  - NLR _____ today  - May use O2 NC, Venturi Mask, NRB as needed depending on oxygen demand, titrate supp O2 prn to maintain SpO2 >93%  - Avoid HFNC/NIPPV/aerosolizing procedures i.e. nebulizations  - Avoid NSAIDs, use tylenol PRN fevers >101.3  - Maintain strict isolation precautions, use proper PPE   - Daily CBC with diff to follow eosinophils, lymphocytes and neutrophils; daily CK  - Given risk factors: check LDH, CRP, ferritin, PT/PTT, CK, lactate, procal, troponin q2-3 days  - Monitor for cardiac decompensation, monitor telemetry  - Satting well on RA, monitor respiratory status closely   - D/c planning today  - Code Status: FULL CODE - CXR with L lung atelectasis, CT chesst with RML groundglass nodule  - Tested COVID (+) on 3/27  - NLR _____ today  - May use O2 NC, Venturi Mask, NRB as needed depending on oxygen demand, titrate supp O2 prn to maintain SpO2 >93%  - Avoid HFNC/NIPPV/aerosolizing procedures i.e. nebulizations  - Avoid NSAIDs, use tylenol PRN fevers >101.3  - Maintain strict isolation precautions, use proper PPE   - Daily CBC with diff to follow eosinophils, lymphocytes and neutrophils; daily CK  - Given risk factors: check LDH, CRP, ferritin, PT/PTT, CK, lactate, procal, troponin q2-3 days  - Monitor for cardiac decompensation, monitor telemetry  - Satting well on RA, monitor respiratory status closely   - D/c planning to [facility] today  - Code Status: FULL CODE - CXR with L lung atelectasis, CT chesst with RML groundglass nodule  - Tested COVID (+) on 3/27  - NLR 1.3 today  - May use O2 NC, Venturi Mask, NRB as needed depending on oxygen demand, titrate supp O2 prn to maintain SpO2 >93%  - Avoid HFNC/NIPPV/aerosolizing procedures i.e. nebulizations  - Avoid NSAIDs, use tylenol PRN fevers >101.3  - Maintain strict isolation precautions, use proper PPE   - Daily CBC with diff to follow eosinophils, lymphocytes and neutrophils; daily CK  - Given risk factors: check LDH, CRP, ferritin, PT/PTT, CK, lactate, procal, troponin q2-3 days  - Monitor for cardiac decompensation, monitor telemetry  - Satting well on RA, monitor respiratory status closely   - D/c planning to [facility] today  - Code Status: FULL CODE - CXR with L lung atelectasis, CT chesst with RML groundglass nodule  - Tested COVID (+) on 3/27  - NLR 3.37 today  - May use O2 NC, Venturi Mask, NRB as needed depending on oxygen demand, titrate supp O2 prn to maintain SpO2 >93%  - Avoid HFNC/NIPPV/aerosolizing procedures i.e. nebulizations  - Avoid NSAIDs, use tylenol PRN fevers >101.3  - Maintain strict isolation precautions, use proper PPE   - Daily CBC with diff to follow eosinophils, lymphocytes and neutrophils; daily CK  - Given risk factors: check LDH, CRP, ferritin, PT/PTT, CK, lactate, procal, troponin q2-3 days  - Monitor for cardiac decompensation, monitor telemetry  - Satting well on RA, monitor respiratory status closely   - D/c planning to [facility] today  - Code Status: FULL CODE - CXR with L lung atelectasis, CT chesst with RML groundglass nodule  - Tested COVID (+) on 3/27  - NLR 5.38 today  - May use O2 NC, Venturi Mask, NRB as needed depending on oxygen demand, titrate supp O2 prn to maintain SpO2 >93%  - Avoid HFNC/NIPPV/aerosolizing procedures i.e. nebulizations  - Avoid NSAIDs, use tylenol PRN fevers >101.3  - Maintain strict isolation precautions, use proper PPE   - Daily CBC with diff to follow eosinophils, lymphocytes and neutrophils; daily CK  - Given risk factors: check LDH, CRP, ferritin, PT/PTT, CK, lactate, procal, troponin q2-3 days  - Monitor for cardiac decompensation, monitor telemetry  - Satting well on RA, monitor respiratory status closely   - D/c planning to [facility] today  - Code Status: FULL CODE

## 2020-04-03 NOTE — PROGRESS NOTE ADULT - SUBJECTIVE AND OBJECTIVE BOX
CHARTING IN PROGRESS    HPI:  Pt is a 79F with PMHx Alzheimer's dementia, breast cancer, who presents to ED with frequent falls, inability to ambulate. Hx obtained from pt's daughter Isidra, who is her caretaker. Pt states pt had two falls yesterday, 1 witnessed w/ head strike, no LOC, 1 unwitnessed. States pt was fine yesterday, ambulating well. Demented at baseline, but usually steady on her feet. Denies signs of fevers, chills, n/v/d in her mother at home. Daughter states that her son-in-law tested COVID (+) and was self quarantining in different floor of the house, and had no contact with her mother. States mother also had episode of incontinence which was unusual for her, but states this happened while pt was on the floor. GOC d/w pt's daughter who states that her and her family are in agreement for pt DNR/DNI status, although pt does not have official HCP or MOLST filled.    In the ED: CTH, CT A/P, CT c-spine, all WNL. CT Chest showing RML nodule. Xray full body series negative for acute f/x. NLR 9. RLE US neg for DVT. Pt was given 1x dose azithro, 1L NS bolus in ED. (26 Mar 2020 22:34)    REVIEW OF SYSTEMS:    CONSTITUTIONAL: No weakness, fevers or chills  EYES/ENT: No visual changes, no throat pain   RESPIRATORY: No cough, wheezing, hemoptysis; No shortness of breath  CARDIOVASCULAR: No chest pain or palpitations  GASTROINTESTINAL: No abdominal, nausea, vomiting, or hematemesis; No diarrhea or constipation. No melena or hematochezia.  GENITOURINARY: No dysuria, frequency or hematuria  NEUROLOGICAL: No dizziness, numbness, or weakness  SKIN: No itching, burning, rashes, or lesions   All other review of systems is negative unless indicated above.    VITAL SIGNS:        PHYSICAL EXAM:     GENERAL: no acute distress  HEENT: NC/AT, EOMI, neck supple, MMM  RESPIRATORY: LCTAB/L, no rhonchi, rales, or wheezing  CARDIOVASCULAR: RRR, no murmurs, gallops, rubs  ABDOMINAL: soft, non-tender, non-distended, positive bowel sounds   EXTREMITIES: no clubbing, cyanosis, or edema  NEUROLOGICAL: alert and oriented x 3, non-focal  SKIN: no rashes or lesions   MUSCULOSKELETAL: no gross joint deformity                          10.8   9.54  )-----------( 234      ( 02 Apr 2020 10:57 )             34.0     04-02    144  |  110<H>  |  15  ----------------------------<  92  3.5   |  27  |  0.59    Ca    8.7      02 Apr 2020 10:57  Mg     2.0     04-01    TPro  5.9<L>  /  Alb  2.5<L>  /  TBili  0.7  /  DBili  x   /  AST  147<H>  /  ALT  109<H>  /  AlkPhos  65  04-02      CAPILLARY BLOOD GLUCOSE          MEDICATIONS  (STANDING):  anastrozole 1 milliGRAM(s) Oral daily  heparin  Injectable 5000 Unit(s) SubCutaneous every 8 hours CHARTING IN PROGRESS    HPI:  Pt is a 79F with PMHx Alzheimer's dementia, breast cancer, who presents to ED with frequent falls, inability to ambulate. Hx obtained from pt's daughter Isidra, who is her caretaker. Pt states pt had two falls yesterday, 1 witnessed w/ head strike, no LOC, 1 unwitnessed. States pt was fine yesterday, ambulating well. Demented at baseline, but usually steady on her feet. Denies signs of fevers, chills, n/v/d in her mother at home. Daughter states that her son-in-law tested COVID (+) and was self quarantining in different floor of the house, and had no contact with her mother. States mother also had episode of incontinence which was unusual for her, but states this happened while pt was on the floor. GOC d/w pt's daughter who states that her and her family are in agreement for pt DNR/DNI status, although pt does not have official HCP or MOLST filled.    In the ED: CTH, CT A/P, CT c-spine, all WNL. CT Chest showing RML nodule. Xray full body series negative for acute f/x. NLR 9. RLE US neg for DVT. Pt was given 1x dose azithro, 1L NS bolus in ED. (26 Mar 2020 22:34)      INTERVAL EVENTS: Patient seen and examined at bedside. No acute events overnight. Patient is awake and alert but confused at this time. Denies any acute complaints or concerns.      REVIEW OF SYSTEMS:  CONSTITUTIONAL: No weakness, fevers or chills  EYES/ENT: No visual changes, no throat pain   RESPIRATORY: No cough, wheezing, hemoptysis; No shortness of breath  CARDIOVASCULAR: No chest pain or palpitations  GASTROINTESTINAL: No abdominal, nausea, vomiting, or hematemesis; No diarrhea or constipation. No melena or hematochezia.  GENITOURINARY: No dysuria, frequency or hematuria  NEUROLOGICAL: No dizziness, numbness, or weakness  SKIN: No itching, burning, rashes, or lesions   All other review of systems is negative unless indicated above.    VITAL SIGNS:  ICU Vital Signs Last 24 Hrs  T(C): 36.8 (03 Apr 2020 07:19), Max: 37.1 (02 Apr 2020 13:01)  T(F): 98.2 (03 Apr 2020 07:19), Max: 98.8 (02 Apr 2020 13:01)  HR: 72 (03 Apr 2020 07:19) (67 - 75)  BP: 150/82 (03 Apr 2020 07:19) (109/69 - 150/82)  RR: 17 (03 Apr 2020 07:19) (17 - 18)  SpO2: 95% (03 Apr 2020 07:19) (92% - 96%)      PHYSICAL EXAM:   GENERAL: no acute distress  HEENT: NC/AT, EOMI, neck supple, MMM  RESPIRATORY: LCTAB/L, no rhonchi, rales, or wheezing  CARDIOVASCULAR: RRR, no murmurs, gallops, rubs  ABDOMINAL: soft, non-tender, non-distended, positive bowel sounds   EXTREMITIES: no clubbing, cyanosis, or edema  NEUROLOGICAL: alert and oriented x 3, non-focal  SKIN: no rashes or lesions   MUSCULOSKELETAL: no gross joint deformity                            10.8   9.54  )-----------( 234      ( 02 Apr 2020 10:57 )             34.0     04-02    144  |  110<H>  |  15  ----------------------------<  92  3.5   |  27  |  0.59    Ca    8.7      02 Apr 2020 10:57  Mg     2.0     04-01    TPro  5.9<L>  /  Alb  2.5<L>  /  TBili  0.7  /  DBili  x   /  AST  147<H>  /  ALT  109<H>  /  AlkPhos  65  04-02      CAPILLARY BLOOD GLUCOSE          MEDICATIONS  (STANDING):  anastrozole 1 milliGRAM(s) Oral daily  heparin  Injectable 5000 Unit(s) SubCutaneous every 8 hours CHARTING IN PROGRESS    HPI:  Pt is a 79F with PMHx Alzheimer's dementia, breast cancer, who presents to ED with frequent falls, inability to ambulate. Hx obtained from pt's daughter Isidra, who is her caretaker. Pt states pt had two falls yesterday, 1 witnessed w/ head strike, no LOC, 1 unwitnessed. States pt was fine yesterday, ambulating well. Demented at baseline, but usually steady on her feet. Denies signs of fevers, chills, n/v/d in her mother at home. Daughter states that her son-in-law tested COVID (+) and was self quarantining in different floor of the house, and had no contact with her mother. States mother also had episode of incontinence which was unusual for her, but states this happened while pt was on the floor. GOC d/w pt's daughter who states that her and her family are in agreement for pt DNR/DNI status, although pt does not have official HCP or MOLST filled.    In the ED: CTH, CT A/P, CT c-spine, all WNL. CT Chest showing RML nodule. Xray full body series negative for acute f/x. NLR 9. RLE US neg for DVT. Pt was given 1x dose azithro, 1L NS bolus in ED. (26 Mar 2020 22:34)      INTERVAL EVENTS: Patient seen and examined at bedside. No acute events overnight. Patient is awake and alert but confused at this time. Denies any acute complaints or concerns.      REVIEW OF SYSTEMS:  CONSTITUTIONAL: No weakness, fevers or chills  EYES/ENT: No visual changes, no throat pain   RESPIRATORY: No cough, wheezing, hemoptysis; No shortness of breath  CARDIOVASCULAR: No chest pain or palpitations  GASTROINTESTINAL: No abdominal, nausea, vomiting, or hematemesis; No diarrhea or constipation. No melena or hematochezia.  GENITOURINARY: No dysuria, frequency or hematuria  NEUROLOGICAL: No dizziness, numbness, or weakness  SKIN: No itching, burning, rashes, or lesions   All other review of systems is negative unless indicated above.    VITAL SIGNS:  Vital Signs Last 24 Hrs  T(C): 36.8 (03 Apr 2020 07:19), Max: 37.1 (02 Apr 2020 13:01)  T(F): 98.2 (03 Apr 2020 07:19), Max: 98.8 (02 Apr 2020 13:01)  HR: 72 (03 Apr 2020 07:19) (67 - 75)  BP: 150/82 (03 Apr 2020 07:19) (109/69 - 150/82)  RR: 17 (03 Apr 2020 07:19) (17 - 18)  SpO2: 95% (03 Apr 2020 07:19) (92% - 96%)      PHYSICAL EXAM:   GENERAL: no acute distress  HEENT: NC/AT, EOMI, neck supple, MMM  RESPIRATORY: LCTAB/L, no rhonchi, rales, or wheezing  CARDIOVASCULAR: RRR, no murmurs, gallops, rubs  ABDOMINAL: soft, non-tender, non-distended, positive bowel sounds   EXTREMITIES: no clubbing, cyanosis, or edema  NEUROLOGICAL: alert and oriented x 3, non-focal  SKIN: no rashes or lesions   MUSCULOSKELETAL: no gross joint deformity                            10.8   9.54  )-----------( 234      ( 02 Apr 2020 10:57 )             34.0     04-02    144  |  110<H>  |  15  ----------------------------<  92  3.5   |  27  |  0.59    Ca    8.7      02 Apr 2020 10:57  Mg     2.0     04-01    TPro  5.9<L>  /  Alb  2.5<L>  /  TBili  0.7  /  DBili  x   /  AST  147<H>  /  ALT  109<H>  /  AlkPhos  65  04-02      CAPILLARY BLOOD GLUCOSE          MEDICATIONS  (STANDING):  anastrozole 1 milliGRAM(s) Oral daily  heparin  Injectable 5000 Unit(s) SubCutaneous every 8 hours HPI:  Pt is a 79F with PMHx Alzheimer's dementia, breast cancer, who presents to ED with frequent falls, inability to ambulate. Hx obtained from pt's daughter Isidra, who is her caretaker. Pt states pt had two falls yesterday, 1 witnessed w/ head strike, no LOC, 1 unwitnessed. States pt was fine yesterday, ambulating well. Demented at baseline, but usually steady on her feet. Denies signs of fevers, chills, n/v/d in her mother at home. Daughter states that her son-in-law tested COVID (+) and was self quarantining in different floor of the house, and had no contact with her mother. States mother also had episode of incontinence which was unusual for her, but states this happened while pt was on the floor. GOC d/w pt's daughter who states that her and her family are in agreement for pt DNR/DNI status, although pt does not have official HCP or MOLST filled.    In the ED: CTH, CT A/P, CT c-spine, all WNL. CT Chest showing RML nodule. Xray full body series negative for acute f/x. NLR 9. RLE US neg for DVT. Pt was given 1x dose azithro, 1L NS bolus in ED. (26 Mar 2020 22:34)      INTERVAL EVENTS: Patient seen and examined at bedside. No acute events overnight. Patient is awake and alert but confused at this time. Denies any acute complaints or concerns.      REVIEW OF SYSTEMS:  CONSTITUTIONAL: No weakness, fevers or chills  EYES/ENT: No visual changes, no throat pain   RESPIRATORY: No cough, wheezing, hemoptysis; No shortness of breath  CARDIOVASCULAR: No chest pain or palpitations  GASTROINTESTINAL: No abdominal, nausea, vomiting, or hematemesis; No diarrhea or constipation. No melena or hematochezia.  GENITOURINARY: No dysuria, frequency or hematuria  NEUROLOGICAL: No dizziness, numbness, or weakness  SKIN: No itching, burning, rashes, or lesions   All other review of systems is negative unless indicated above.    VITAL SIGNS:  Vital Signs Last 24 Hrs  T(C): 36.8 (03 Apr 2020 07:19), Max: 37.1 (02 Apr 2020 13:01)  T(F): 98.2 (03 Apr 2020 07:19), Max: 98.8 (02 Apr 2020 13:01)  HR: 72 (03 Apr 2020 07:19) (67 - 75)  BP: 150/82 (03 Apr 2020 07:19) (109/69 - 150/82)  RR: 17 (03 Apr 2020 07:19) (17 - 18)  SpO2: 95% (03 Apr 2020 07:19) (92% - 96%)      PHYSICAL EXAM:   GENERAL: no acute distress  HEENT: NC/AT, EOMI, neck supple, MMM  RESPIRATORY: LCTAB/L, no rhonchi, rales, or wheezing  CARDIOVASCULAR: RRR, no murmurs, gallops, rubs  ABDOMINAL: soft, non-tender, non-distended, positive bowel sounds   EXTREMITIES: no clubbing, cyanosis, or edema  NEUROLOGICAL: alert and oriented x 3, non-focal  SKIN: no rashes or lesions   MUSCULOSKELETAL: no gross joint deformity                            10.8   9.54  )-----------( 234      ( 02 Apr 2020 10:57 )             34.0     04-02    144  |  110<H>  |  15  ----------------------------<  92  3.5   |  27  |  0.59    Ca    8.7      02 Apr 2020 10:57  Mg     2.0     04-01    TPro  5.9<L>  /  Alb  2.5<L>  /  TBili  0.7  /  DBili  x   /  AST  147<H>  /  ALT  109<H>  /  AlkPhos  65  04-02        MEDICATIONS  (STANDING):  anastrozole 1 milliGRAM(s) Oral daily  heparin  Injectable 5000 Unit(s) SubCutaneous every 8 hours HPI:  Pt is a 79F with PMHx Alzheimer's dementia, breast cancer, who presents to ED with frequent falls, inability to ambulate. Hx obtained from pt's daughter Isidra, who is her caretaker. Pt states pt had two falls yesterday, 1 witnessed w/ head strike, no LOC, 1 unwitnessed. States pt was fine yesterday, ambulating well. Demented at baseline, but usually steady on her feet. Denies signs of fevers, chills, n/v/d in her mother at home. Daughter states that her son-in-law tested COVID (+) and was self quarantining in different floor of the house, and had no contact with her mother. States mother also had episode of incontinence which was unusual for her, but states this happened while pt was on the floor. GOC d/w pt's daughter who states that her and her family are in agreement for pt DNR/DNI status, although pt does not have official HCP or MOLST filled.    In the ED: CTH, CT A/P, CT c-spine, all WNL. CT Chest showing RML nodule. Xray full body series negative for acute f/x. NLR 9. RLE US neg for DVT. Pt was given 1x dose azithro, 1L NS bolus in ED. (26 Mar 2020 22:34)      INTERVAL EVENTS: Patient seen and examined at bedside. No acute events overnight. Patient is awake and alert but confused at this time. Denies any acute complaints or concerns.      REVIEW OF SYSTEMS:  CONSTITUTIONAL: No weakness, fevers or chills  EYES/ENT: No visual changes, no throat pain   RESPIRATORY: No cough, wheezing, hemoptysis; No shortness of breath  CARDIOVASCULAR: No chest pain or palpitations  GASTROINTESTINAL: No abdominal, nausea, vomiting, or hematemesis; No diarrhea or constipation. No melena or hematochezia.  GENITOURINARY: No dysuria, frequency or hematuria  NEUROLOGICAL: No dizziness, numbness, or weakness  SKIN: No itching, burning, rashes, or lesions   All other review of systems is negative unless indicated above.    VITAL SIGNS:  Vital Signs Last 24 Hrs  T(C): 36.8 (03 Apr 2020 07:19), Max: 37.1 (02 Apr 2020 13:01)  T(F): 98.2 (03 Apr 2020 07:19), Max: 98.8 (02 Apr 2020 13:01)  HR: 72 (03 Apr 2020 07:19) (67 - 75)  BP: 150/82 (03 Apr 2020 07:19) (109/69 - 150/82)  RR: 17 (03 Apr 2020 07:19) (17 - 18)  SpO2: 95% (03 Apr 2020 07:19) (92% - 96%)      PHYSICAL EXAM:   GENERAL: no acute distress  HEENT: NC/AT, EOMI, neck supple, MMM  RESPIRATORY: LCTAB/L, no rhonchi, rales, or wheezing  CARDIOVASCULAR: RRR, no murmurs, gallops, rubs  ABDOMINAL: soft, non-tender, non-distended, positive bowel sounds   EXTREMITIES: no clubbing, cyanosis, or edema  NEUROLOGICAL: alert and oriented x 1, non-focal  SKIN: no rashes or lesions   MUSCULOSKELETAL: no gross joint deformity                            10.8   9.54  )-----------( 234      ( 02 Apr 2020 10:57 )             34.0     04-02    144  |  110<H>  |  15  ----------------------------<  92  3.5   |  27  |  0.59    Ca    8.7      02 Apr 2020 10:57  Mg     2.0     04-01    TPro  5.9<L>  /  Alb  2.5<L>  /  TBili  0.7  /  DBili  x   /  AST  147<H>  /  ALT  109<H>  /  AlkPhos  65  04-02        MEDICATIONS  (STANDING):  anastrozole 1 milliGRAM(s) Oral daily  heparin  Injectable 5000 Unit(s) SubCutaneous every 8 hours

## 2020-09-06 NOTE — DISCHARGE NOTE PROVIDER - NSDCMRMEDTOKEN_GEN_ALL_CORE_FT
anastrozole 1 mg oral tablet: 1 tab(s) orally once a day  predniSONE: 10 milligram(s) orally once a day  Tylenol 8 HR Arthritis Pain 650 mg oral tablet, extended release: 2 tab(s) orally 2 times a day, As Needed DISPLAY PLAN FREE TEXT acetaminophen 160 mg/5 mL oral suspension: 20.31 milliliter(s) orally every 6 hours, As needed, Mild Pain (1 - 3)  acetaminophen 160 mg/5 mL oral suspension: 20.31 milliliter(s) orally every 6 hours, As needed, Temp greater or equal to 38C (100.4F)  anastrozole 1 mg oral tablet: 1 tab(s) orally once a day  heparin: 5000 unit(s) subcutaneous 3 times a day  predniSONE: 10 milligram(s) orally once a day

## 2021-01-14 NOTE — PATIENT PROFILE ADULT - CONTRAINDICATIONS & PRECAUTIONS (SELECT ALL THAT APPLY)
How Severe Are They?: moderate Is This A New Presentation, Or A Follow-Up?: Skin Lesions Patient/surrogate refused vaccine...

## 2021-04-17 NOTE — PROVIDER CONTACT NOTE (OTHER) - SITUATION
Pt noted to have RLE yellow discoloration compared to left leg. Dr. Bah made aware and came to assess pt at bedside. Home

## 2021-06-22 NOTE — ED PROVIDER NOTE - MUSCULOSKELETAL NECK EXAM
[facility] Daily Progress     Patient Name: Renee Sebastian  Date: 2018  Visit #: 5  PTA visit #:  0  Referral Diagnosis: Trochanteric bursitis, right hip  Referring provider: Mriyam Corbin *  Visit Diagnosis:     ICD-10-CM    1. Right hip pain M25.551    2. Weakness of right hip R29.898          Assessment:     Patient demonstrates understanding/independence with home program.  Response to Intervention good  Patient is appropriate to continue with skilled physical therapy intervention, as indicated by initial plan of care.    Goal Status:  Pt. will demonstrate/verbalize independence in self-management of condition in : 6 weeks;Progressing toward  Pt. will be independent with home exercise program in : 6 weeks;Progressing toward  Pt. will be able to walk : for exercise/recreation;with no pain;in 12 weeks;Comment;Progressing toward  Comment:: without limits  Patient will return to: sport;exercise;in 12 weeks;Progressing toward  Comment: running    No Data Recorded    Plan / Patient Education:     Continue with initial plan of care.  Progress with home program as tolerated.    Subjective:     Pain Ratin  Pretty good. A little more sore with walking about 15 min today, rated 1/10, also with rising from sitting. New exercise is challenging.       Objective:     Genu valgus with femoral IR noted in (R) SLS phase of side step up exercise.    Treatment Today     TREATMENT MINUTES COMMENTS   Evaluation     Self-care/ Home management     Manual therapy 5 MFR - (R) protrusive hip   Neuromuscular Re-education     Therapeutic Activity     Therapeutic Exercises 25 Exercises per flow sheet. Added new exercise, reviewed/modified selected exercises   Gait training     Modality__________________                Total 30    Blank areas are intentional and mean the treatment did not include these items.       Kaye Giraldo  2018    
no deformity, pain or tenderness. no restriction of movement

## 2022-03-28 NOTE — H&P ADULT - NSHPPHYSICALEXAM_GEN_ALL_CORE
T(C): 37.1 (03-26-20 @ 18:32), Max: 37.1 (03-26-20 @ 18:32)  HR: 81 (03-26-20 @ 18:32) (81 - 92)  BP: 156/77 (03-26-20 @ 18:32) (156/77 - 156/88)  RR: 17 (03-26-20 @ 18:32) (15 - 17)  SpO2: 98% (03-26-20 @ 18:32) (97% - 98%)    Physical Exam:  General: Well developed, well nourished, NAD  HEENT: NC/AT, PERRLA, EOMI B/L, moist mucous membranes   Neck: Supple, nontender, no masses  CV: RRR, +S1/S2, no murmurs, rubs or gallops  Respiratory: CTA B/L, No W/R/R  Abdominal: Soft, NT, ND +BSx4  Extremities: No C/C/E, + peripheral pulses  Neurology: AAOx3, nonfocal, CN II-XII grossly intact, sensation intact T(C): 37.1 (03-26-20 @ 18:32), Max: 37.1 (03-26-20 @ 18:32)  HR: 81 (03-26-20 @ 18:32) (81 - 92)  BP: 156/77 (03-26-20 @ 18:32) (156/77 - 156/88)  RR: 17 (03-26-20 @ 18:32) (15 - 17)  SpO2: 98% (03-26-20 @ 18:32) (97% - 98%)    Physical Exam:  General: Well developed, well nourished, NAD  HEENT: NC/AT, PERRLA, EOMI B/L, moist mucous membranes   Neck: Supple, nontender, no masses  CV: RRR, +S1/S2, no murmurs, rubs or gallops  Respiratory: CTA B/L, No W/R/R  Abdominal: Soft, NT, ND +BSx4  Extremities: Extreme TTP R LE especially at area of tibial plateau. T(C): 37.1 (03-26-20 @ 18:32), Max: 37.1 (03-26-20 @ 18:32)  HR: 81 (03-26-20 @ 18:32) (81 - 92)  BP: 156/77 (03-26-20 @ 18:32) (156/77 - 156/88)  RR: 17 (03-26-20 @ 18:32) (15 - 17)  SpO2: 98% (03-26-20 @ 18:32) (97% - 98%)    Physical Exam:  General: Well developed, well nourished, NAD  HEENT: NC/AT, PERRLA, EOMI B/L, moist mucous membranes   Neck: Supple, nontender, no masses  CV: RRR, +S1/S2, 2/6 crescendo-decrescendo murmur, no rubs or gallops  Respiratory: CTA B/L, No W/R/R  Abdominal: Soft, NT, ND +BSx4  Extremities: Extreme TTP R LE especially at area of tibial plateau. no loss of consciousness, no gait abnormality, no headache, no sensory deficits, and no weakness.

## 2022-07-11 NOTE — DISCHARGE NOTE PROVIDER - NSCORESITESY/N_GEN_A_CORE_RD
----- Message from Trixie Bailey sent at 7/11/2022  2:32 PM CDT -----  Type: RX Refill Request    Who Called: self     Have you contacted your pharmacy: no    Refill or New Rx: refill    RX Name and Strength: amLODIPine (NORVASC) 10 MG tablet    Preferred Pharmacy with phone number: [facility]/pharmacy #[id number] - [place], [place] - [address] [facility]   Phone:  [phone]  Fax:  [phone]    Local or Mail Order: local    Would the patient rather a call back or a response via [facility]? call    Best Call Back Number: [phone] (home)              Yes

## 2022-07-22 NOTE — PATIENT PROFILE ADULT - MONEY FOR FOOD
Refill request for donepezil.  Last seen 1/12/22;  Last filled 06/10/22 for #30 - no showed last appointment.  #15 given til next apt   no

## 2024-12-03 NOTE — PATIENT PROFILE ADULT - NSPROGENOTHERPROVIDER_GEN_A_NUR
Called patient to see if he wanted to be seen today. Per patient he rather keep appointment for tomorrow 12/04/24.    Problem: PHYSICAL THERAPY ADULT  Goal: Performs mobility at highest level of function for planned discharge setting  See evaluation for individualized goals  Description: Treatment/Interventions: Functional transfer training, LE strengthening/ROM, Elevations, Therapeutic exercise, Endurance training, Patient/family training, Equipment eval/education, Bed mobility, Gait training, Compensatory technique education, Spoke to nursing, OT          See flowsheet documentation for full assessment, interventions and recommendations  Outcome: Progressing  Note: Prognosis: Good  Problem List: Decreased strength, Decreased endurance, Impaired balance, Decreased mobility, Decreased coordination, Impaired judgement, Decreased safety awareness  Assessment: Pt seen for PT treatment session this date with interventions consisting of gait training w/ emphasis on improving pt's ability to ambulate level surfaces x 12 ft, 25 ft with CGA provided by therapist with no AD, Therapeutic exercise consisting of: AROM 20 reps B LE in sitting position and therapeutic activity consisting of training: bed mobility, supine<>sit transfers, sit<>stand transfers, static standing tolerance for 2-3 minutes w/ B UE support and vc and tactile cues for static standing posture faciliation  Pt agreeable to PT treatment session upon arrival, pt found supine in bed w/ HOB elevated, in no apparent distress, A&O x 4 and responsive  In comparison to previous session, pt with improvements in continued tolerance to increased OOB activity and gt distance  Post session: pt returned back to recliner, all needs in reach and RN notified of session findings/recommendations  Continue to recommend STR at time of d/c in order to maximize pt's functional independence and safety w/ mobility  Pt continues to be functioning below baseline level, and remains limited 2* factors listed above   PT will continue to see pt while here in order to address the deficits listed above and provide interventions consistent w/ POC in effort to achieve STGs  Barriers to Discharge: Decreased caregiver support, Inaccessible home environment  Barriers to Discharge Comments: Pt requires increased assistance for mobility and lives alone  PT Discharge Recommendation: Post-Acute Rehabilitation Services     PT - OK to Discharge: Yes(when medically cleared, if to STR)    See flowsheet documentation for full assessment  home care Dr Neal [phone] primary  DR Schmidt [phone] neuro  DR Varinder Patterson [phone](oncologist)/medical specialist